# Patient Record
Sex: FEMALE | Race: WHITE | Employment: OTHER | URBAN - METROPOLITAN AREA
[De-identification: names, ages, dates, MRNs, and addresses within clinical notes are randomized per-mention and may not be internally consistent; named-entity substitution may affect disease eponyms.]

---

## 2017-02-23 RX ORDER — PRAVASTATIN SODIUM 20 MG
TABLET ORAL
Qty: 90 TABLET | Refills: 1 | Status: SHIPPED | OUTPATIENT
Start: 2017-02-23 | End: 2017-08-17 | Stop reason: SDUPTHER

## 2017-03-01 RX ORDER — LORAZEPAM 0.5 MG/1
TABLET ORAL
Qty: 180 TABLET | Refills: 0 | OUTPATIENT
Start: 2017-03-01 | End: 2017-05-30 | Stop reason: SDUPTHER

## 2017-03-20 RX ORDER — PROPRANOLOL HYDROCHLORIDE 10 MG/1
TABLET ORAL
Qty: 180 TABLET | Refills: 1 | Status: ON HOLD | OUTPATIENT
Start: 2017-03-20 | End: 2017-07-21 | Stop reason: HOSPADM

## 2017-04-17 ENCOUNTER — OFFICE VISIT (OUTPATIENT)
Dept: FAMILY MEDICINE CLINIC | Age: 77
End: 2017-04-17

## 2017-04-17 VITALS
DIASTOLIC BLOOD PRESSURE: 74 MMHG | SYSTOLIC BLOOD PRESSURE: 136 MMHG | WEIGHT: 169.4 LBS | TEMPERATURE: 97.4 F | BODY MASS INDEX: 26.59 KG/M2 | HEART RATE: 54 BPM | HEIGHT: 67 IN | OXYGEN SATURATION: 99 %

## 2017-04-17 DIAGNOSIS — F41.9 ANXIETY: ICD-10-CM

## 2017-04-17 DIAGNOSIS — R00.2 PALPITATIONS: ICD-10-CM

## 2017-04-17 DIAGNOSIS — I10 BENIGN ESSENTIAL HYPERTENSION: Primary | ICD-10-CM

## 2017-04-17 DIAGNOSIS — G89.29 CHRONIC MIDLINE LOW BACK PAIN WITHOUT SCIATICA: ICD-10-CM

## 2017-04-17 DIAGNOSIS — M54.50 CHRONIC MIDLINE LOW BACK PAIN WITHOUT SCIATICA: ICD-10-CM

## 2017-04-17 LAB
A/G RATIO: 1.9 (ref 1.1–2.2)
ALBUMIN SERPL-MCNC: 3.8 G/DL (ref 3.4–5)
ALP BLD-CCNC: 81 U/L (ref 40–129)
ALT SERPL-CCNC: 15 U/L (ref 10–40)
ANION GAP SERPL CALCULATED.3IONS-SCNC: 11 MMOL/L (ref 3–16)
AST SERPL-CCNC: 17 U/L (ref 15–37)
BASOPHILS ABSOLUTE: 0.1 K/UL (ref 0–0.2)
BASOPHILS RELATIVE PERCENT: 1.1 %
BILIRUB SERPL-MCNC: 0.4 MG/DL (ref 0–1)
BUN BLDV-MCNC: 22 MG/DL (ref 7–20)
CALCIUM SERPL-MCNC: 9.6 MG/DL (ref 8.3–10.6)
CHLORIDE BLD-SCNC: 101 MMOL/L (ref 99–110)
CO2: 29 MMOL/L (ref 21–32)
CREAT SERPL-MCNC: 0.8 MG/DL (ref 0.6–1.2)
EOSINOPHILS ABSOLUTE: 0.1 K/UL (ref 0–0.6)
EOSINOPHILS RELATIVE PERCENT: 2.4 %
GFR AFRICAN AMERICAN: >60
GFR NON-AFRICAN AMERICAN: >60
GLOBULIN: 2 G/DL
GLUCOSE BLD-MCNC: 93 MG/DL (ref 70–99)
HCT VFR BLD CALC: 39.7 % (ref 36–48)
HEMOGLOBIN: 12.8 G/DL (ref 12–16)
LYMPHOCYTES ABSOLUTE: 1.8 K/UL (ref 1–5.1)
LYMPHOCYTES RELATIVE PERCENT: 30.1 %
MCH RBC QN AUTO: 29.6 PG (ref 26–34)
MCHC RBC AUTO-ENTMCNC: 32.2 G/DL (ref 31–36)
MCV RBC AUTO: 92 FL (ref 80–100)
MONOCYTES ABSOLUTE: 0.5 K/UL (ref 0–1.3)
MONOCYTES RELATIVE PERCENT: 8.1 %
NEUTROPHILS ABSOLUTE: 3.5 K/UL (ref 1.7–7.7)
NEUTROPHILS RELATIVE PERCENT: 58.3 %
PDW BLD-RTO: 13.9 % (ref 12.4–15.4)
PLATELET # BLD: 214 K/UL (ref 135–450)
PMV BLD AUTO: 10.8 FL (ref 5–10.5)
POTASSIUM SERPL-SCNC: 4.6 MMOL/L (ref 3.5–5.1)
RBC # BLD: 4.31 M/UL (ref 4–5.2)
SODIUM BLD-SCNC: 141 MMOL/L (ref 136–145)
TOTAL PROTEIN: 5.8 G/DL (ref 6.4–8.2)
WBC # BLD: 6 K/UL (ref 4–11)

## 2017-04-17 PROCEDURE — 36415 COLL VENOUS BLD VENIPUNCTURE: CPT | Performed by: FAMILY MEDICINE

## 2017-04-17 PROCEDURE — 99214 OFFICE O/P EST MOD 30 MIN: CPT | Performed by: FAMILY MEDICINE

## 2017-05-27 RX ORDER — AMLODIPINE BESYLATE 2.5 MG/1
TABLET ORAL
Qty: 180 TABLET | Refills: 1 | Status: ON HOLD | OUTPATIENT
Start: 2017-05-27 | End: 2017-07-21 | Stop reason: HOSPADM

## 2017-06-01 RX ORDER — LORAZEPAM 0.5 MG/1
TABLET ORAL
Qty: 180 TABLET | Refills: 0 | OUTPATIENT
Start: 2017-06-01 | End: 2017-08-29 | Stop reason: SDUPTHER

## 2017-06-06 ENCOUNTER — OFFICE VISIT (OUTPATIENT)
Dept: FAMILY MEDICINE CLINIC | Age: 77
End: 2017-06-06

## 2017-06-06 VITALS
HEART RATE: 75 BPM | DIASTOLIC BLOOD PRESSURE: 76 MMHG | WEIGHT: 170 LBS | SYSTOLIC BLOOD PRESSURE: 128 MMHG | OXYGEN SATURATION: 95 % | TEMPERATURE: 97.5 F | BODY MASS INDEX: 26.63 KG/M2

## 2017-06-06 DIAGNOSIS — J01.10 ACUTE NON-RECURRENT FRONTAL SINUSITIS: Primary | ICD-10-CM

## 2017-06-06 PROCEDURE — 99213 OFFICE O/P EST LOW 20 MIN: CPT | Performed by: FAMILY MEDICINE

## 2017-06-06 RX ORDER — AZITHROMYCIN 250 MG/1
TABLET, FILM COATED ORAL
Qty: 1 PACKET | Refills: 0 | Status: SHIPPED | OUTPATIENT
Start: 2017-06-06 | End: 2017-06-16

## 2017-07-20 ENCOUNTER — OFFICE VISIT (OUTPATIENT)
Dept: FAMILY MEDICINE CLINIC | Age: 77
End: 2017-07-20

## 2017-07-20 VITALS
OXYGEN SATURATION: 97 % | WEIGHT: 169.38 LBS | DIASTOLIC BLOOD PRESSURE: 66 MMHG | BODY MASS INDEX: 26.53 KG/M2 | SYSTOLIC BLOOD PRESSURE: 119 MMHG | HEART RATE: 119 BPM

## 2017-07-20 DIAGNOSIS — I48.91 NEW ONSET ATRIAL FIBRILLATION (HCC): Primary | ICD-10-CM

## 2017-07-20 DIAGNOSIS — R06.02 SOB (SHORTNESS OF BREATH): ICD-10-CM

## 2017-07-20 DIAGNOSIS — R61 DIAPHORESIS: ICD-10-CM

## 2017-07-20 PROCEDURE — 99213 OFFICE O/P EST LOW 20 MIN: CPT | Performed by: FAMILY MEDICINE

## 2017-07-20 PROCEDURE — 93000 ELECTROCARDIOGRAM COMPLETE: CPT | Performed by: FAMILY MEDICINE

## 2017-07-24 ENCOUNTER — OFFICE VISIT (OUTPATIENT)
Dept: FAMILY MEDICINE CLINIC | Age: 77
End: 2017-07-24

## 2017-07-24 VITALS
TEMPERATURE: 97.5 F | WEIGHT: 170 LBS | BODY MASS INDEX: 26.63 KG/M2 | HEART RATE: 55 BPM | SYSTOLIC BLOOD PRESSURE: 122 MMHG | DIASTOLIC BLOOD PRESSURE: 70 MMHG | OXYGEN SATURATION: 97 %

## 2017-07-24 DIAGNOSIS — I48.0 INTERMITTENT ATRIAL FIBRILLATION (HCC): ICD-10-CM

## 2017-07-24 DIAGNOSIS — R00.1 BRADYCARDIA: ICD-10-CM

## 2017-07-24 DIAGNOSIS — R00.2 PALPITATIONS: ICD-10-CM

## 2017-07-24 DIAGNOSIS — I10 BENIGN ESSENTIAL HYPERTENSION: ICD-10-CM

## 2017-07-24 DIAGNOSIS — I48.91 NEW ONSET ATRIAL FIBRILLATION (HCC): Primary | ICD-10-CM

## 2017-07-24 PROCEDURE — 99213 OFFICE O/P EST LOW 20 MIN: CPT | Performed by: FAMILY MEDICINE

## 2017-07-26 ENCOUNTER — ANTI-COAG VISIT (OUTPATIENT)
Dept: PHARMACY | Facility: CLINIC | Age: 77
End: 2017-07-26

## 2017-07-26 DIAGNOSIS — I48.0 INTERMITTENT ATRIAL FIBRILLATION (HCC): ICD-10-CM

## 2017-07-26 LAB — INR BLD: 1.1

## 2017-07-28 ENCOUNTER — NURSE ONLY (OUTPATIENT)
Dept: FAMILY MEDICINE CLINIC | Age: 77
End: 2017-07-28

## 2017-07-28 DIAGNOSIS — Z51.81 MEDICATION MONITORING ENCOUNTER: Primary | ICD-10-CM

## 2017-07-28 DIAGNOSIS — Z51.81 MEDICATION MONITORING ENCOUNTER: ICD-10-CM

## 2017-07-28 DIAGNOSIS — I48.0 INTERMITTENT ATRIAL FIBRILLATION (HCC): ICD-10-CM

## 2017-07-28 DIAGNOSIS — I10 BENIGN ESSENTIAL HYPERTENSION: ICD-10-CM

## 2017-07-28 LAB — DIGOXIN LEVEL: 0.6 NG/ML (ref 0.8–2)

## 2017-07-28 PROCEDURE — 36415 COLL VENOUS BLD VENIPUNCTURE: CPT | Performed by: FAMILY MEDICINE

## 2017-08-02 ENCOUNTER — ANTI-COAG VISIT (OUTPATIENT)
Dept: PHARMACY | Facility: CLINIC | Age: 77
End: 2017-08-02

## 2017-08-02 LAB — INR BLD: 2.1

## 2017-08-07 RX ORDER — DIGOXIN 125 MCG
125 TABLET ORAL DAILY
Qty: 30 TABLET | Refills: 5 | Status: SHIPPED | OUTPATIENT
Start: 2017-08-07 | End: 2017-09-13 | Stop reason: SDUPTHER

## 2017-08-07 RX ORDER — WARFARIN SODIUM 2.5 MG/1
TABLET ORAL
Qty: 60 TABLET | Refills: 5 | Status: SHIPPED | OUTPATIENT
Start: 2017-08-07 | End: 2017-09-19 | Stop reason: SDUPTHER

## 2017-08-09 ENCOUNTER — ANTI-COAG VISIT (OUTPATIENT)
Dept: PHARMACY | Facility: CLINIC | Age: 77
End: 2017-08-09

## 2017-08-09 LAB — INR BLD: 2.2

## 2017-08-11 ENCOUNTER — OFFICE VISIT (OUTPATIENT)
Dept: FAMILY MEDICINE CLINIC | Age: 77
End: 2017-08-11

## 2017-08-11 ENCOUNTER — TELEPHONE (OUTPATIENT)
Dept: FAMILY MEDICINE CLINIC | Age: 77
End: 2017-08-11

## 2017-08-11 VITALS
HEART RATE: 51 BPM | WEIGHT: 172 LBS | OXYGEN SATURATION: 98 % | BODY MASS INDEX: 26.94 KG/M2 | DIASTOLIC BLOOD PRESSURE: 64 MMHG | SYSTOLIC BLOOD PRESSURE: 148 MMHG | TEMPERATURE: 97.5 F

## 2017-08-11 DIAGNOSIS — I10 UNCONTROLLED HYPERTENSION: Primary | ICD-10-CM

## 2017-08-11 DIAGNOSIS — M25.561 ACUTE PAIN OF BOTH KNEES: ICD-10-CM

## 2017-08-11 DIAGNOSIS — M54.50 CHRONIC MIDLINE LOW BACK PAIN WITHOUT SCIATICA: Primary | ICD-10-CM

## 2017-08-11 DIAGNOSIS — G89.29 CHRONIC MIDLINE LOW BACK PAIN WITHOUT SCIATICA: Primary | ICD-10-CM

## 2017-08-11 DIAGNOSIS — M25.562 ACUTE PAIN OF BOTH KNEES: ICD-10-CM

## 2017-08-11 PROCEDURE — 99214 OFFICE O/P EST MOD 30 MIN: CPT | Performed by: FAMILY MEDICINE

## 2017-08-11 RX ORDER — ACETAMINOPHEN 500 MG
500-1000 TABLET ORAL 3 TIMES DAILY PRN
Qty: 1 TABLET | Refills: 0 | Status: SHIPPED
Start: 2017-08-11

## 2017-08-11 RX ORDER — AMLODIPINE BESYLATE 2.5 MG/1
2.5 TABLET ORAL 2 TIMES DAILY
Qty: 1 TABLET | Refills: 0
Start: 2017-08-11 | End: 2018-04-10 | Stop reason: SDUPTHER

## 2017-08-17 RX ORDER — PRAVASTATIN SODIUM 20 MG
TABLET ORAL
Qty: 90 TABLET | Refills: 1 | Status: SHIPPED | OUTPATIENT
Start: 2017-08-17 | End: 2018-02-08 | Stop reason: SDUPTHER

## 2017-08-23 ENCOUNTER — ANTI-COAG VISIT (OUTPATIENT)
Dept: PHARMACY | Facility: CLINIC | Age: 77
End: 2017-08-23

## 2017-08-23 DIAGNOSIS — I48.0 INTERMITTENT ATRIAL FIBRILLATION (HCC): ICD-10-CM

## 2017-08-23 LAB — INR BLD: 2.7

## 2017-08-25 PROBLEM — M17.0 PRIMARY OSTEOARTHRITIS OF KNEES, BILATERAL: Status: ACTIVE | Noted: 2017-08-25

## 2017-08-29 ENCOUNTER — OFFICE VISIT (OUTPATIENT)
Dept: FAMILY MEDICINE CLINIC | Age: 77
End: 2017-08-29

## 2017-08-29 VITALS
DIASTOLIC BLOOD PRESSURE: 80 MMHG | BODY MASS INDEX: 27 KG/M2 | HEART RATE: 58 BPM | OXYGEN SATURATION: 96 % | WEIGHT: 172.4 LBS | SYSTOLIC BLOOD PRESSURE: 130 MMHG

## 2017-08-29 DIAGNOSIS — F41.9 ANXIETY: ICD-10-CM

## 2017-08-29 DIAGNOSIS — I48.0 INTERMITTENT ATRIAL FIBRILLATION (HCC): ICD-10-CM

## 2017-08-29 DIAGNOSIS — E78.00 PURE HYPERCHOLESTEROLEMIA: ICD-10-CM

## 2017-08-29 DIAGNOSIS — I10 BENIGN ESSENTIAL HYPERTENSION: Primary | ICD-10-CM

## 2017-08-29 DIAGNOSIS — M17.0 PRIMARY OSTEOARTHRITIS OF KNEES, BILATERAL: ICD-10-CM

## 2017-08-29 PROCEDURE — 36415 COLL VENOUS BLD VENIPUNCTURE: CPT | Performed by: FAMILY MEDICINE

## 2017-08-29 PROCEDURE — 99214 OFFICE O/P EST MOD 30 MIN: CPT | Performed by: FAMILY MEDICINE

## 2017-08-29 RX ORDER — LORAZEPAM 0.5 MG/1
TABLET ORAL
Qty: 180 TABLET | Refills: 0 | Status: SHIPPED | OUTPATIENT
Start: 2017-08-29 | End: 2017-12-08 | Stop reason: SDUPTHER

## 2017-08-29 ASSESSMENT — PATIENT HEALTH QUESTIONNAIRE - PHQ9
2. FEELING DOWN, DEPRESSED OR HOPELESS: 0
SUM OF ALL RESPONSES TO PHQ QUESTIONS 1-9: 0
1. LITTLE INTEREST OR PLEASURE IN DOING THINGS: 0
SUM OF ALL RESPONSES TO PHQ9 QUESTIONS 1 & 2: 0

## 2017-08-30 LAB
CHOLESTEROL, TOTAL: 199 MG/DL (ref 0–199)
HDLC SERPL-MCNC: 56 MG/DL (ref 40–60)
LDL CHOLESTEROL CALCULATED: 118 MG/DL
TRIGL SERPL-MCNC: 124 MG/DL (ref 0–150)
VLDLC SERPL CALC-MCNC: 25 MG/DL

## 2017-09-13 ENCOUNTER — ANTI-COAG VISIT (OUTPATIENT)
Dept: PHARMACY | Facility: CLINIC | Age: 77
End: 2017-09-13

## 2017-09-13 DIAGNOSIS — I48.0 INTERMITTENT ATRIAL FIBRILLATION (HCC): ICD-10-CM

## 2017-09-13 LAB — INR BLD: 2.3

## 2017-09-13 RX ORDER — DIGOXIN 125 MCG
125 TABLET ORAL DAILY
Qty: 90 TABLET | Refills: 3 | Status: SHIPPED | OUTPATIENT
Start: 2017-09-13 | End: 2017-12-18 | Stop reason: SDUPTHER

## 2017-09-19 RX ORDER — WARFARIN SODIUM 2.5 MG/1
TABLET ORAL
Qty: 180 TABLET | Refills: 1 | Status: SHIPPED | OUTPATIENT
Start: 2017-09-19 | End: 2018-03-22 | Stop reason: SDUPTHER

## 2017-09-21 ENCOUNTER — NURSE ONLY (OUTPATIENT)
Dept: FAMILY MEDICINE CLINIC | Age: 77
End: 2017-09-21

## 2017-09-21 DIAGNOSIS — Z23 NEED FOR INFLUENZA VACCINATION: Primary | ICD-10-CM

## 2017-09-21 PROCEDURE — 90688 IIV4 VACCINE SPLT 0.5 ML IM: CPT | Performed by: FAMILY MEDICINE

## 2017-09-21 PROCEDURE — G0008 ADMIN INFLUENZA VIRUS VAC: HCPCS | Performed by: FAMILY MEDICINE

## 2017-10-11 ENCOUNTER — ANTI-COAG VISIT (OUTPATIENT)
Dept: PHARMACY | Facility: CLINIC | Age: 77
End: 2017-10-11

## 2017-10-11 DIAGNOSIS — I48.0 INTERMITTENT ATRIAL FIBRILLATION (HCC): ICD-10-CM

## 2017-10-11 LAB — INR BLD: 2.6

## 2017-11-08 ENCOUNTER — ANTI-COAG VISIT (OUTPATIENT)
Dept: PHARMACY | Facility: CLINIC | Age: 77
End: 2017-11-08

## 2017-11-08 LAB — INR BLD: 2.8

## 2017-12-06 ENCOUNTER — ANTI-COAG VISIT (OUTPATIENT)
Dept: PHARMACY | Facility: CLINIC | Age: 77
End: 2017-12-06

## 2017-12-06 LAB — INR BLD: 2.4

## 2017-12-08 RX ORDER — LORAZEPAM 0.5 MG/1
TABLET ORAL
Qty: 180 TABLET | Refills: 0 | OUTPATIENT
Start: 2017-12-08 | End: 2018-04-04 | Stop reason: SDUPTHER

## 2017-12-18 ENCOUNTER — OFFICE VISIT (OUTPATIENT)
Dept: FAMILY MEDICINE CLINIC | Age: 77
End: 2017-12-18

## 2017-12-18 VITALS
HEART RATE: 64 BPM | SYSTOLIC BLOOD PRESSURE: 130 MMHG | BODY MASS INDEX: 27.66 KG/M2 | DIASTOLIC BLOOD PRESSURE: 70 MMHG | WEIGHT: 176.6 LBS | OXYGEN SATURATION: 98 %

## 2017-12-18 DIAGNOSIS — K21.9 GASTROESOPHAGEAL REFLUX DISEASE, ESOPHAGITIS PRESENCE NOT SPECIFIED: ICD-10-CM

## 2017-12-18 DIAGNOSIS — I48.0 INTERMITTENT ATRIAL FIBRILLATION (HCC): ICD-10-CM

## 2017-12-18 DIAGNOSIS — F41.9 ANXIETY: ICD-10-CM

## 2017-12-18 DIAGNOSIS — I10 BENIGN ESSENTIAL HYPERTENSION: Primary | ICD-10-CM

## 2017-12-18 PROCEDURE — G8427 DOCREV CUR MEDS BY ELIG CLIN: HCPCS | Performed by: FAMILY MEDICINE

## 2017-12-18 PROCEDURE — G8417 CALC BMI ABV UP PARAM F/U: HCPCS | Performed by: FAMILY MEDICINE

## 2017-12-18 PROCEDURE — 1090F PRES/ABSN URINE INCON ASSESS: CPT | Performed by: FAMILY MEDICINE

## 2017-12-18 PROCEDURE — 1123F ACP DISCUSS/DSCN MKR DOCD: CPT | Performed by: FAMILY MEDICINE

## 2017-12-18 PROCEDURE — 99214 OFFICE O/P EST MOD 30 MIN: CPT | Performed by: FAMILY MEDICINE

## 2017-12-18 PROCEDURE — 4040F PNEUMOC VAC/ADMIN/RCVD: CPT | Performed by: FAMILY MEDICINE

## 2017-12-18 PROCEDURE — G8399 PT W/DXA RESULTS DOCUMENT: HCPCS | Performed by: FAMILY MEDICINE

## 2017-12-18 PROCEDURE — G8484 FLU IMMUNIZE NO ADMIN: HCPCS | Performed by: FAMILY MEDICINE

## 2017-12-18 PROCEDURE — 1036F TOBACCO NON-USER: CPT | Performed by: FAMILY MEDICINE

## 2017-12-18 RX ORDER — DIGOXIN 125 MCG
125 TABLET ORAL DAILY
Qty: 90 TABLET | Refills: 3 | Status: SHIPPED | OUTPATIENT
Start: 2017-12-18 | End: 2018-11-23 | Stop reason: SDUPTHER

## 2017-12-18 NOTE — PROGRESS NOTES
oriented, and in no acute distress  Psych:  mood and affect are unremarkable               speech and thought processes appear intact               Behavior and appearance unremarkable  Neck:  No masses, trachea midline, phonation normal   Thyroid - unremarkable              Cervical  adenopathy - nothing significant  Chest:  No deformity of thorax               Respirations easy and unlabored              Lungs - clear to auscultation     Breath sounds - equal  Heart:   Rhythm - regular              Murmur - none    Elizabeth Barrett MD    Outpatient Prescriptions Marked as Taking for the 12/18/17 encounter (Office Visit) with Elizabeth Barrett MD   Medication Sig Dispense Refill    digoxin (LANOXIN) 125 MCG tablet Take 1 tablet by mouth daily 90 tablet 3    LORazepam (ATIVAN) 0.5 MG tablet TAKE 1 TABLET TWICE A  tablet 0    warfarin (COUMADIN) 2.5 MG tablet 5 mg (2.5 mg x 2) on Mon, Wed, Fri; 2.5 mg (2.5 mg x 1) all other days 180 tablet 1    diclofenac sodium 1 % GEL Apply 2 g topically 2 times daily 1 Tube 3    pravastatin (PRAVACHOL) 20 MG tablet TAKE 1 TABLET BY MOUTH NIGHTLY 90 tablet 1    amLODIPine (NORVASC) 2.5 MG tablet Take 1 tablet by mouth 2 times daily 1 tablet 0    acetaminophen (TYLENOL) 500 MG tablet Take 1-2 tablets by mouth 3 times daily as needed for Pain 1 tablet 0    calcium carbonate 600 MG TABS tablet Take 1 tablet by mouth daily. The note was completed using Dragon voice recognition transcription. Every effort was made to ensure accuracy; however, inadvertent  transcription errors may be present despite my best efforts to edit errors.

## 2017-12-18 NOTE — PATIENT INSTRUCTIONS
Please read the healthy family handout that you were given and share it with your family. Please compare this printed medication list with your medications at home to be sure they are the same. If you have any medications that are different please contact us immediately at [phone]. Also review your allergies that we have listed, these may also include medications that you have not been able to tolerate, make sure everything listed is correct. If you have any allergies that are different please contact us immediately at [phone].

## 2017-12-26 RX ORDER — AMLODIPINE BESYLATE 2.5 MG/1
TABLET ORAL
Qty: 180 TABLET | Refills: 1 | Status: SHIPPED | OUTPATIENT
Start: 2017-12-26 | End: 2018-06-17 | Stop reason: SDUPTHER

## 2018-01-03 ENCOUNTER — ANTI-COAG VISIT (OUTPATIENT)
Dept: PHARMACY | Facility: CLINIC | Age: 78
End: 2018-01-03

## 2018-01-03 LAB — INR BLD: 2.4

## 2018-01-03 NOTE — PROGRESS NOTES
Ms. Jesse Willis is here for management of anticoagulation for AFib. PMH also significant for HTN and HLD. She presents today w/out complaint. Pt verifies dosing regimen as listed above. Pt denies s/s bleeding/bruising/swelling/SOB. No BRBPR. No melena. Denies missed doses. Reviewed home medications. No changes in RX/OTCs/Herbal medications. Reviewed dietary concerns   Denies EToH and tobacco use. INR 2.4 is within acceptable therapeutic range of 2-3  Recommend to continue warfarin regimen of 2.5 mg daily, except 5mg MWF. Patient has 2.5 mg tablets. Will continue to monitor and check INR in 4 weeks. Dosing reminder card given with phone number, appointment date and time.    Return to clinic: 1/31 @ 9:30am

## 2018-01-31 ENCOUNTER — ANTI-COAG VISIT (OUTPATIENT)
Dept: PHARMACY | Facility: CLINIC | Age: 78
End: 2018-01-31

## 2018-01-31 LAB — INR BLD: 2.8

## 2018-02-09 RX ORDER — PRAVASTATIN SODIUM 20 MG
TABLET ORAL
Qty: 90 TABLET | Refills: 1 | Status: SHIPPED | OUTPATIENT
Start: 2018-02-09 | End: 2018-08-03 | Stop reason: SDUPTHER

## 2018-02-28 ENCOUNTER — ANTI-COAG VISIT (OUTPATIENT)
Dept: PHARMACY | Facility: CLINIC | Age: 78
End: 2018-02-28

## 2018-02-28 LAB — INR BLD: 3.1

## 2018-03-22 RX ORDER — WARFARIN SODIUM 2.5 MG/1
TABLET ORAL
Qty: 180 TABLET | Refills: 1 | Status: SHIPPED | OUTPATIENT
Start: 2018-03-22 | End: 2018-12-03 | Stop reason: SDUPTHER

## 2018-03-28 ENCOUNTER — ANTI-COAG VISIT (OUTPATIENT)
Dept: PHARMACY | Facility: CLINIC | Age: 78
End: 2018-03-28

## 2018-03-28 LAB — INR BLD: 3.6

## 2018-04-05 RX ORDER — LORAZEPAM 0.5 MG/1
TABLET ORAL
Qty: 180 TABLET | Refills: 0 | OUTPATIENT
Start: 2018-04-05 | End: 2018-07-28 | Stop reason: SDUPTHER

## 2018-04-10 ENCOUNTER — OFFICE VISIT (OUTPATIENT)
Dept: FAMILY MEDICINE CLINIC | Age: 78
End: 2018-04-10

## 2018-04-10 VITALS
BODY MASS INDEX: 27.28 KG/M2 | WEIGHT: 173.8 LBS | SYSTOLIC BLOOD PRESSURE: 132 MMHG | HEIGHT: 67 IN | TEMPERATURE: 98.8 F | HEART RATE: 59 BPM | DIASTOLIC BLOOD PRESSURE: 74 MMHG | OXYGEN SATURATION: 95 %

## 2018-04-10 DIAGNOSIS — I48.0 INTERMITTENT ATRIAL FIBRILLATION (HCC): ICD-10-CM

## 2018-04-10 DIAGNOSIS — M17.0 PRIMARY OSTEOARTHRITIS OF KNEES, BILATERAL: ICD-10-CM

## 2018-04-10 DIAGNOSIS — E78.00 PURE HYPERCHOLESTEROLEMIA: ICD-10-CM

## 2018-04-10 DIAGNOSIS — F41.9 ANXIETY: ICD-10-CM

## 2018-04-10 DIAGNOSIS — I10 BENIGN ESSENTIAL HYPERTENSION: Primary | ICD-10-CM

## 2018-04-10 LAB
ANION GAP SERPL CALCULATED.3IONS-SCNC: 13 MMOL/L (ref 3–16)
BANDED NEUTROPHILS RELATIVE PERCENT: 1 % (ref 0–7)
BASOPHILS ABSOLUTE: 0 K/UL (ref 0–0.2)
BASOPHILS RELATIVE PERCENT: 0 %
BUN BLDV-MCNC: 13 MG/DL (ref 7–20)
CALCIUM SERPL-MCNC: 9.2 MG/DL (ref 8.3–10.6)
CHLORIDE BLD-SCNC: 99 MMOL/L (ref 99–110)
CHOLESTEROL, TOTAL: 197 MG/DL (ref 0–199)
CO2: 27 MMOL/L (ref 21–32)
CREAT SERPL-MCNC: 0.7 MG/DL (ref 0.6–1.2)
DIGOXIN LEVEL: 1 NG/ML (ref 0.8–2)
EOSINOPHILS ABSOLUTE: 0 K/UL (ref 0–0.6)
EOSINOPHILS RELATIVE PERCENT: 0 %
GFR AFRICAN AMERICAN: >60
GFR NON-AFRICAN AMERICAN: >60
GLUCOSE BLD-MCNC: 92 MG/DL (ref 70–99)
HCT VFR BLD CALC: 39.4 % (ref 36–48)
HDLC SERPL-MCNC: 45 MG/DL (ref 40–60)
HEMOGLOBIN: 13.4 G/DL (ref 12–16)
LDL CHOLESTEROL CALCULATED: 116 MG/DL
LYMPHOCYTES ABSOLUTE: 1.9 K/UL (ref 1–5.1)
LYMPHOCYTES RELATIVE PERCENT: 26 %
MCH RBC QN AUTO: 29.1 PG (ref 26–34)
MCHC RBC AUTO-ENTMCNC: 33.9 G/DL (ref 31–36)
MCV RBC AUTO: 85.8 FL (ref 80–100)
MONOCYTES ABSOLUTE: 0.8 K/UL (ref 0–1.3)
MONOCYTES RELATIVE PERCENT: 11 %
NEUTROPHILS ABSOLUTE: 4.5 K/UL (ref 1.7–7.7)
NEUTROPHILS RELATIVE PERCENT: 62 %
PDW BLD-RTO: 14.9 % (ref 12.4–15.4)
PLATELET # BLD: 235 K/UL (ref 135–450)
PMV BLD AUTO: 10.1 FL (ref 5–10.5)
POTASSIUM SERPL-SCNC: 4.4 MMOL/L (ref 3.5–5.1)
RBC # BLD: 4.59 M/UL (ref 4–5.2)
SODIUM BLD-SCNC: 139 MMOL/L (ref 136–145)
TRIGL SERPL-MCNC: 180 MG/DL (ref 0–150)
VLDLC SERPL CALC-MCNC: 36 MG/DL
WBC # BLD: 7.2 K/UL (ref 4–11)

## 2018-04-10 PROCEDURE — 99214 OFFICE O/P EST MOD 30 MIN: CPT | Performed by: FAMILY MEDICINE

## 2018-04-10 PROCEDURE — 36415 COLL VENOUS BLD VENIPUNCTURE: CPT | Performed by: FAMILY MEDICINE

## 2018-04-11 ENCOUNTER — ANTI-COAG VISIT (OUTPATIENT)
Dept: PHARMACY | Facility: CLINIC | Age: 78
End: 2018-04-11

## 2018-04-11 LAB — INR BLD: 3.5

## 2018-04-20 ENCOUNTER — OFFICE VISIT (OUTPATIENT)
Dept: FAMILY MEDICINE CLINIC | Age: 78
End: 2018-04-20

## 2018-04-20 VITALS
TEMPERATURE: 97.6 F | SYSTOLIC BLOOD PRESSURE: 128 MMHG | WEIGHT: 174.8 LBS | BODY MASS INDEX: 27.38 KG/M2 | DIASTOLIC BLOOD PRESSURE: 74 MMHG | HEART RATE: 61 BPM | OXYGEN SATURATION: 96 %

## 2018-04-20 DIAGNOSIS — H11.32 SUBCONJUNCTIVAL HEMORRHAGE OF LEFT EYE: Primary | ICD-10-CM

## 2018-04-20 DIAGNOSIS — Z79.01 CHRONIC ANTICOAGULATION: ICD-10-CM

## 2018-04-20 PROCEDURE — 99213 OFFICE O/P EST LOW 20 MIN: CPT | Performed by: FAMILY MEDICINE

## 2018-04-25 ENCOUNTER — ANTI-COAG VISIT (OUTPATIENT)
Dept: PHARMACY | Facility: CLINIC | Age: 78
End: 2018-04-25

## 2018-04-25 LAB — INR BLD: 1.9

## 2018-05-16 ENCOUNTER — ANTI-COAG VISIT (OUTPATIENT)
Dept: PHARMACY | Facility: CLINIC | Age: 78
End: 2018-05-16

## 2018-05-16 DIAGNOSIS — I48.0 INTERMITTENT ATRIAL FIBRILLATION (HCC): ICD-10-CM

## 2018-05-16 LAB — INR BLD: 2.4

## 2018-06-01 ENCOUNTER — OFFICE VISIT (OUTPATIENT)
Dept: FAMILY MEDICINE CLINIC | Age: 78
End: 2018-06-01

## 2018-06-01 VITALS
OXYGEN SATURATION: 95 % | TEMPERATURE: 97.7 F | DIASTOLIC BLOOD PRESSURE: 76 MMHG | SYSTOLIC BLOOD PRESSURE: 134 MMHG | WEIGHT: 175 LBS | BODY MASS INDEX: 27.41 KG/M2 | HEART RATE: 74 BPM

## 2018-06-01 DIAGNOSIS — R41.3 MEMORY LOSS: Primary | ICD-10-CM

## 2018-06-01 DIAGNOSIS — I48.0 INTERMITTENT ATRIAL FIBRILLATION (HCC): ICD-10-CM

## 2018-06-01 PROCEDURE — 99213 OFFICE O/P EST LOW 20 MIN: CPT | Performed by: FAMILY MEDICINE

## 2018-06-13 ENCOUNTER — ANTI-COAG VISIT (OUTPATIENT)
Dept: PHARMACY | Facility: CLINIC | Age: 78
End: 2018-06-13

## 2018-06-13 DIAGNOSIS — I48.0 INTERMITTENT ATRIAL FIBRILLATION (HCC): ICD-10-CM

## 2018-06-13 LAB — INR BLD: 3.5

## 2018-06-18 RX ORDER — AMLODIPINE BESYLATE 2.5 MG/1
TABLET ORAL
Qty: 180 TABLET | Refills: 1 | Status: SHIPPED | OUTPATIENT
Start: 2018-06-18 | End: 2018-12-11 | Stop reason: SDUPTHER

## 2018-06-27 ENCOUNTER — ANTI-COAG VISIT (OUTPATIENT)
Dept: PHARMACY | Facility: CLINIC | Age: 78
End: 2018-06-27

## 2018-06-27 DIAGNOSIS — I48.0 INTERMITTENT ATRIAL FIBRILLATION (HCC): ICD-10-CM

## 2018-06-27 LAB — INR BLD: 3.6

## 2018-07-11 ENCOUNTER — ANTI-COAG VISIT (OUTPATIENT)
Dept: PHARMACY | Facility: CLINIC | Age: 78
End: 2018-07-11

## 2018-07-11 DIAGNOSIS — I48.0 INTERMITTENT ATRIAL FIBRILLATION (HCC): ICD-10-CM

## 2018-07-11 LAB — INR BLD: 2.7

## 2018-07-17 ENCOUNTER — OFFICE VISIT (OUTPATIENT)
Dept: FAMILY MEDICINE CLINIC | Age: 78
End: 2018-07-17

## 2018-07-17 VITALS
SYSTOLIC BLOOD PRESSURE: 131 MMHG | HEART RATE: 58 BPM | RESPIRATION RATE: 16 BRPM | BODY MASS INDEX: 27.28 KG/M2 | WEIGHT: 174.2 LBS | DIASTOLIC BLOOD PRESSURE: 84 MMHG | OXYGEN SATURATION: 95 %

## 2018-07-17 DIAGNOSIS — K21.9 GASTROESOPHAGEAL REFLUX DISEASE WITHOUT ESOPHAGITIS: ICD-10-CM

## 2018-07-17 DIAGNOSIS — R41.3 MEMORY CHANGES: Primary | ICD-10-CM

## 2018-07-17 DIAGNOSIS — R42 DIZZINESS: ICD-10-CM

## 2018-07-17 PROCEDURE — 99213 OFFICE O/P EST LOW 20 MIN: CPT | Performed by: NURSE PRACTITIONER

## 2018-07-17 RX ORDER — OMEPRAZOLE 20 MG/1
20 CAPSULE, DELAYED RELEASE ORAL DAILY
Qty: 30 CAPSULE | Refills: 3 | Status: SHIPPED | OUTPATIENT
Start: 2018-07-17 | End: 2018-11-01 | Stop reason: SDUPTHER

## 2018-07-17 ASSESSMENT — ENCOUNTER SYMPTOMS
ABDOMINAL PAIN: 1
RESPIRATORY NEGATIVE: 1
EYES NEGATIVE: 1
ALLERGIC/IMMUNOLOGIC NEGATIVE: 1

## 2018-07-17 NOTE — PROGRESS NOTES
Subjective:      Patient ID: Rich Galeas is a [de-identified] y.o. female. HPI    Chief Complaint   Patient presents with    Memory Loss     Pt states this began after beginning digoxin medication,gotten worse over past 3-4 months, worse in the evenings      Patient reports concerns with forgetfulness. Patient/daughter reports this has been intermittent forgetfulness that seems to be worse when she fatigues in the evening. Patient/daugher denies any current safety concerns. Patient questions if digoxin may be cause of forgetfulness as she feels she has been more forgetful since of this medication - Daughter does not agree with this. Daughter feels patient has mild gradual forgetfulness over the past few years. Patient/daughter denies any abrupt changes recent or remote. MINI-MENTAL STATUS EXAM (Rebecca Calero)    What is the Year? Season? Date? Day? Month?   (indicate # correct)        4    Where are we: State? County? Town? Place? Floor? (indicate # correct)        5    Name 3 objects and have pt repeat.                (indicate # correct)        3    Serial 7's or spell \"world\" backwards.                 (indicate # correct)        2    Recall 3 objects                (indicate # correct)        1    Patient names a pencil & a watch                (indicate # correct)        2    Read and obey \"Close your eyes\"                (indicate 1 if correct)     1    Copy intersecting pentagons                (indicate 1 if correct)     1    Write a sentence                (indicate 1 if correct)     1    Repeat \"no ifs, ands, or buts\"                (indicate 1 if correct)     1    Follow 3-stage command                (indicate # done correctly) 3                                            ------------  TOTAL SCORE   (max = 30)                  24      REFERENCE INFORMATION FOR THE CLINICIAN:    \"Low\" score    = 0-23  \"Normal\" score = 24-30    GERD  Paitent complains of heartburn.  This has been associated with epigastric pain and nocturnal burning. She denies any other symptoms. Symptoms have been present for several months. She denies dysphagia. She has not lost weight. She denies melena, hematochezia, hematemesis, and coffee ground emesis. Medical therapy in the past has included antacids. Review of Systems   Constitutional: Negative for appetite change, chills and fever. HENT: Negative. Eyes: Negative. Respiratory: Negative. Cardiovascular: Negative. Gastrointestinal: Positive for abdominal pain (epigastric). Endocrine: Negative. Genitourinary: Negative. Musculoskeletal: Negative. Skin: Negative. Allergic/Immunologic: Negative. Neurological: Positive for dizziness. Psychiatric/Behavioral: Negative. Patient Active Problem List   Diagnosis    Anxiety    Chronic midline low back pain without sciatica    Ex-smoker    Benign essential hypertension    Pure hypercholesterolemia    Intermittent atrial fibrillation (HCC)    Primary osteoarthritis of knees, bilateral       Outpatient Prescriptions Marked as Taking for the 7/17/18 encounter (Office Visit) with ROBERT Montanez CNP   Medication Sig Dispense Refill    amLODIPine (NORVASC) 2.5 MG tablet TAKE 1 TABLET BY MOUTH TWICE A  tablet 1    LORazepam (ATIVAN) 0.5 MG tablet TAKE 1 TABLET BY MOUTH TWICE A  tablet 0    warfarin (COUMADIN) 2.5 MG tablet TAKE 2 TABLETS ON MON WED FRID AND 1 TABLET ALL OTHER DAYS 180 tablet 1    pravastatin (PRAVACHOL) 20 MG tablet TAKE 1 TABLET BY MOUTH NIGHTLY 90 tablet 1    digoxin (LANOXIN) 125 MCG tablet Take 1 tablet by mouth daily 90 tablet 3    calcium carbonate 600 MG TABS tablet Take 1 tablet by mouth daily.          Allergies   Allergen Reactions    Beta Adrenergic Blockers Other (See Comments)     Bradycardia       Social History   Substance Use Topics    Smoking status: Former Smoker     Packs/day: 0.25     Years: 40.00     Quit date: 7/23/2017    Smokeless tobacco: Never Used      Comment: smokes 4 cigarettes a dayand has since 2007    Alcohol use No       Objective:   /84 (Site: Left Arm, Position: Sitting, Cuff Size: Medium Adult)   Pulse 58   Resp 16   Wt 174 lb 3.2 oz (79 kg)   SpO2 95%   BMI 27.28 kg/m²     Physical Exam   Constitutional: She is oriented to person, place, and time. Vital signs are normal. She appears well-developed and well-nourished. She is cooperative. She does not have a sickly appearance. No distress. HENT:   Head: Normocephalic and atraumatic. Eyes: Conjunctivae and EOM are normal. Pupils are equal, round, and reactive to light. Neck: Normal range of motion. Neck supple. Carotid bruit is not present. No thyromegaly present. Cardiovascular: Normal rate, regular rhythm, S1 normal, S2 normal, normal heart sounds, intact distal pulses and normal pulses. Exam reveals no gallop. No murmur heard. Pulmonary/Chest: Effort normal and breath sounds normal. No accessory muscle usage. No respiratory distress. Abdominal: Soft. Bowel sounds are normal.   Musculoskeletal: Normal range of motion. Lymphadenopathy:     She has no cervical adenopathy. Neurological: She is alert and oriented to person, place, and time. Skin: Skin is warm and dry. Psychiatric: She has a normal mood and affect. Her speech is normal and behavior is normal.   Vitals reviewed. Assessment/Plan:   1. Memory changes  Patient/daughter reports forgetfulness over the past 2 years. Patient/daughter denies any abrupt changes. Patient does fill changes may have been slightly worsened since being on digoxin. Daughter reports Dr. Iza Sevilla (cardiologist) does not feel patient's forgetfulness is related to digoxin. ? If related to normal aging process. Discussed possible causes and recommend carotid US as below. Patient/daughter agreeable. - US CAROTID ARTERY BILATERAL; Future    2. Dizziness  See note above. - US CAROTID ARTERY BILATERAL; Future    3.

## 2018-07-17 NOTE — PATIENT INSTRUCTIONS
Patient Education        Gastroesophageal Reflux Disease (GERD): Care Instructions  Your Care Instructions    Gastroesophageal reflux disease (GERD) is the backward flow of stomach acid into the esophagus. The esophagus is the tube that leads from your throat to your stomach. A one-way valve prevents the stomach acid from moving up into this tube. When you have GERD, this valve does not close tightly enough. If you have mild GERD symptoms including heartburn, you may be able to control the problem with antacids or over-the-counter medicine. Changing your diet, losing weight, and making other lifestyle changes can also help reduce symptoms. Follow-up care is a key part of your treatment and safety. Be sure to make and go to all appointments, and call your doctor if you are having problems. It's also a good idea to know your test results and keep a list of the medicines you take. How can you care for yourself at home? · Take your medicines exactly as prescribed. Call your doctor if you think you are having a problem with your medicine. · Your doctor may recommend over-the-counter medicine. For mild or occasional indigestion, antacids, such as Tums, Gaviscon, Mylanta, or Maalox, may help. Your doctor also may recommend over-the-counter acid reducers, such as Pepcid AC, Tagamet HB, Zantac 75, or Prilosec. Read and follow all instructions on the label. If you use these medicines often, talk with your doctor. · Change your eating habits. ¨ It's best to eat several small meals instead of two or three large meals. ¨ After you eat, wait 2 to 3 hours before you lie down. ¨ Chocolate, mint, and alcohol can make GERD worse. ¨ Spicy foods, foods that have a lot of acid (like tomatoes and oranges), and coffee can make GERD symptoms worse in some people. If your symptoms are worse after you eat a certain food, you may want to stop eating that food to see if your symptoms get better.   · Do not smoke or chew tobacco. omeprazole? Omeprazole is a proton pump inhibitor that decreases the amount of acid produced in the stomach. Omeprazole is used to treat symptoms of gastroesophageal reflux disease (GERD) and other conditions caused by excess stomach acid. Omeprazole is also used to promote healing of erosive esophagitis (damage to your esophagus caused by stomach acid). Omeprazole may also be given together with antibiotics to treat gastric ulcer caused by infection with helicobacter pylori (H. pylori). Omeprazole is not for immediate relief of heartburn symptoms. Omeprazole may also be used for purposes not listed in this medication guide. What should I discuss with my healthcare provider before taking omeprazole? Heartburn is often confused with the first symptoms of a heart attack. Seek emergency medical attention if you have chest pain or heavy feeling, pain spreading to the arm or shoulder, nausea, sweating, and a general ill feeling. You should not use this medicine if you are allergic to omeprazole or to any benzimidazole medicine such as albendazole or mebendazole. Ask a doctor or pharmacist if it is safe for you to use omeprazole if you have other medical conditions, especially:  · liver disease;  · low levels of magnesium in your blood; or  · osteoporosis or low bone mineral density (osteopenia). Do not use over-the-counter omeprazole (Prilosec OTC) without the advice of a doctor if you have:  · trouble or pain with swallowing;  · bloody or black stools, vomit that looks like blood or coffee grounds;  · heartburn that has lasted for over 3 months;  · frequent chest pain, heartburn with wheezing;  · unexplained weight loss; or  · nausea or vomiting, stomach pain. Taking a proton pump inhibitor such as omeprazole may increase your risk of bone fracture in the hip, wrist, or spine.  This effect has occurred mostly in people who have taken the medication long term or at high doses, and in those who are age 48 and make sure you get the entire dose, add a little more water to the same glass, swirl gently and drink right away. This mixture can also be given through a nasogastric (NG) feeding tube using only a catheter-tipped syringe. Shake the syringe well, then attach it to the NG tube and push the plunger down to empty the syringe into the tube. Refill the syringe with water and flush the tube to wash the contents down. Use this medicine for the full prescribed length of time. Your symptoms may improve before your condition is completely cleared. If you use omeprazole for longer than 3 years, you could develop a vitamin B-12 deficiency. Talk to your doctor about how to manage this condition if you develop it. Call your doctor if your symptoms do not improve, or if they get worse while using omeprazole. Some conditions are treated with a combination of omeprazole and antibiotics. Use all medications as directed by your doctor. Read the medication guide or patient instructions provided with each medication. Do not change your doses or medication schedule without your doctor's advice. This medicine can cause unusual results with certain medical tests, and you may need to stop using the medicine for a short time before a test. Tell any doctor who treats you that you are using omeprazole. Store at room temperature away from moisture and heat. What happens if I miss a dose? Take the missed dose as soon as you remember. Skip the missed dose if it is almost time for your next scheduled dose. Do not  take extra medicine to make up the missed dose. What happens if I overdose? Seek emergency medical attention or call the Poison Help line at 1-442.771.7881. What should I avoid while taking omeprazole? This medicine can cause diarrhea, which may be a sign of a new infection. If you have diarrhea that is watery or bloody, call your doctor. Do not use anti-diarrhea medicine unless your doctor tells you to.   What are the all other medicines out of the reach of children, never share your medicines with others, and use this medication only for the indication prescribed. Every effort has been made to ensure that the information provided by [facility] [address] is accurate, up-to-date, and complete, but no guarantee is made to that effect. Drug information contained herein may be time sensitive. [facility] information has been compiled for use by healthcare practitioners and consumers in the [place] and therefore [facility] does not warrant that uses outside of the [place] are appropriate, unless specifically indicated otherwise. [facility]'s drug information does not endorse drugs, diagnose patients or recommend therapy. [facility]'s drug information is an informational resource designed to assist licensed healthcare practitioners in caring for their patients and/or to serve consumers viewing this service as a supplement to, and not a substitute for, the expertise, skill, knowledge and judgment of healthcare practitioners. The absence of a warning for a given drug or drug combination in no way should be construed to indicate that the drug or drug combination is safe, effective or appropriate for any given patient. [facility] does not assume any responsibility for any aspect of healthcare administered with the aid of information [facility] provides. The information contained herein is not intended to cover all possible uses, directions, precautions, warnings, drug interactions, allergic reactions, or adverse effects. If you have questions about the drugs you are taking, check with your doctor, nurse or pharmacist.  Copyright 4188-4700 [address]: 17.01. Revision date: 2/2/2015. Care instructions adapted under license by [facility] ([facility]).  If you have questions about a medical condition or this instruction, always ask your healthcare professional. [place] [address] any warranty or liability for your use of

## 2018-07-23 ENCOUNTER — HOSPITAL ENCOUNTER (OUTPATIENT)
Dept: VASCULAR LAB | Age: 78
Discharge: HOME | End: 2018-07-23
Payer: MEDICARE

## 2018-07-23 PROCEDURE — 93880 EXTRACRANIAL BILAT STUDY: CPT

## 2018-07-28 DIAGNOSIS — F41.9 ANXIETY: Primary | ICD-10-CM

## 2018-07-30 RX ORDER — LORAZEPAM 0.5 MG/1
TABLET ORAL
Qty: 180 TABLET | Refills: 0 | OUTPATIENT
Start: 2018-07-30 | End: 2018-10-22 | Stop reason: SDUPTHER

## 2018-07-30 NOTE — TELEPHONE ENCOUNTER
OK to phone prescription in to pharmacy. [facility] ([facility] and [facility] reports) for the last 12 months was requested and reviewed today. The results of the report was consistent with the current treatment plan.

## 2018-07-30 NOTE — TELEPHONE ENCOUNTER
Date of last refill of this med was 4/5, # of pills given 180 and # of refills given 0. Their next appointment is 8/20, the last date patient was seen was 4/10. Does patient have medication agreement on file? Yes  Has drug screen been done in last 12 months if needed? no  Has [facility] report been ran in last 90 days?  7/30/18 (must input date)

## 2018-08-03 RX ORDER — PRAVASTATIN SODIUM 20 MG
TABLET ORAL
Qty: 90 TABLET | Refills: 1 | Status: SHIPPED | OUTPATIENT
Start: 2018-08-03 | End: 2018-12-03 | Stop reason: SDUPTHER

## 2018-08-07 ENCOUNTER — TELEPHONE (OUTPATIENT)
Dept: FAMILY MEDICINE CLINIC | Age: 78
End: 2018-08-07

## 2018-08-08 ENCOUNTER — ANTI-COAG VISIT (OUTPATIENT)
Dept: PHARMACY | Age: 78
End: 2018-08-08
Payer: MEDICARE

## 2018-08-08 DIAGNOSIS — I48.0 INTERMITTENT ATRIAL FIBRILLATION (HCC): ICD-10-CM

## 2018-08-08 LAB — INR BLD: 2.9

## 2018-08-08 PROCEDURE — 85610 PROTHROMBIN TIME: CPT | Performed by: PHARMACIST

## 2018-08-08 PROCEDURE — 99211 OFF/OP EST MAY X REQ PHY/QHP: CPT | Performed by: PHARMACIST

## 2018-08-10 ENCOUNTER — APPOINTMENT (OUTPATIENT)
Dept: GENERAL RADIOLOGY | Age: 78
End: 2018-08-10
Payer: MEDICARE

## 2018-08-10 ENCOUNTER — HOSPITAL ENCOUNTER (EMERGENCY)
Age: 78
Discharge: HOME | End: 2018-08-10
Attending: EMERGENCY MEDICINE
Payer: MEDICARE

## 2018-08-10 VITALS
HEART RATE: 62 BPM | BODY MASS INDEX: 27.31 KG/M2 | RESPIRATION RATE: 16 BRPM | HEIGHT: 67 IN | TEMPERATURE: 97.8 F | SYSTOLIC BLOOD PRESSURE: 148 MMHG | OXYGEN SATURATION: 99 % | DIASTOLIC BLOOD PRESSURE: 78 MMHG | WEIGHT: 174 LBS

## 2018-08-10 DIAGNOSIS — S60.222A CONTUSION OF LEFT HAND, INITIAL ENCOUNTER: ICD-10-CM

## 2018-08-10 DIAGNOSIS — S80.02XA CONTUSION OF LEFT KNEE, INITIAL ENCOUNTER: Primary | ICD-10-CM

## 2018-08-10 DIAGNOSIS — S80.01XA CONTUSION OF RIGHT KNEE, INITIAL ENCOUNTER: ICD-10-CM

## 2018-08-10 LAB
A/G RATIO: 1.2 (ref 1.1–2.2)
ALBUMIN SERPL-MCNC: 3.9 G/DL (ref 3.4–5)
ALP BLD-CCNC: 82 U/L (ref 40–129)
ALT SERPL-CCNC: 21 U/L (ref 10–40)
ANION GAP SERPL CALCULATED.3IONS-SCNC: 12 MMOL/L (ref 3–16)
AST SERPL-CCNC: 19 U/L (ref 15–37)
BASOPHILS ABSOLUTE: 0.1 K/UL (ref 0–0.2)
BASOPHILS RELATIVE PERCENT: 1 %
BILIRUB SERPL-MCNC: 0.8 MG/DL (ref 0–1)
BILIRUBIN URINE: NEGATIVE
BLOOD, URINE: NEGATIVE
BUN BLDV-MCNC: 12 MG/DL (ref 7–20)
CALCIUM SERPL-MCNC: 9.3 MG/DL (ref 8.3–10.6)
CHLORIDE BLD-SCNC: 102 MMOL/L (ref 99–110)
CLARITY: CLEAR
CO2: 24 MMOL/L (ref 21–32)
COLOR: YELLOW
CREAT SERPL-MCNC: 0.8 MG/DL (ref 0.6–1.2)
EOSINOPHILS ABSOLUTE: 0 K/UL (ref 0–0.6)
EOSINOPHILS RELATIVE PERCENT: 0.3 %
GFR AFRICAN AMERICAN: >60
GFR NON-AFRICAN AMERICAN: >60
GLOBULIN: 3.3 G/DL
GLUCOSE BLD-MCNC: 113 MG/DL (ref 70–99)
GLUCOSE URINE: NEGATIVE MG/DL
HCT VFR BLD CALC: 42.1 % (ref 36–48)
HEMOGLOBIN: 13.7 G/DL (ref 12–16)
INR BLD: 1.86 (ref 0.86–1.14)
KETONES, URINE: NEGATIVE MG/DL
LEUKOCYTE ESTERASE, URINE: NEGATIVE
LYMPHOCYTES ABSOLUTE: 1.8 K/UL (ref 1–5.1)
LYMPHOCYTES RELATIVE PERCENT: 17.9 %
MCH RBC QN AUTO: 28.8 PG (ref 26–34)
MCHC RBC AUTO-ENTMCNC: 32.4 G/DL (ref 31–36)
MCV RBC AUTO: 88.8 FL (ref 80–100)
MICROSCOPIC EXAMINATION: NORMAL
MONOCYTES ABSOLUTE: 0.7 K/UL (ref 0–1.3)
MONOCYTES RELATIVE PERCENT: 6.5 %
NEUTROPHILS ABSOLUTE: 7.6 K/UL (ref 1.7–7.7)
NEUTROPHILS RELATIVE PERCENT: 74.3 %
NITRITE, URINE: NEGATIVE
PDW BLD-RTO: 14.7 % (ref 12.4–15.4)
PH UA: 6
PLATELET # BLD: 263 K/UL (ref 135–450)
PMV BLD AUTO: 9.8 FL (ref 5–10.5)
POTASSIUM SERPL-SCNC: 4.3 MMOL/L (ref 3.5–5.1)
PROTEIN UA: NEGATIVE MG/DL
PROTHROMBIN TIME: 20.8 SEC (ref 9.8–13)
RBC # BLD: 4.75 M/UL (ref 4–5.2)
SODIUM BLD-SCNC: 138 MMOL/L (ref 136–145)
SPECIFIC GRAVITY UA: <=1.005
TOTAL PROTEIN: 7.2 G/DL (ref 6.4–8.2)
URINE TYPE: NORMAL
UROBILINOGEN, URINE: 0.2 E.U./DL
WBC # BLD: 10.3 K/UL (ref 4–11)

## 2018-08-10 PROCEDURE — 36415 COLL VENOUS BLD VENIPUNCTURE: CPT

## 2018-08-10 PROCEDURE — 81003 URINALYSIS AUTO W/O SCOPE: CPT

## 2018-08-10 PROCEDURE — 85025 COMPLETE CBC W/AUTO DIFF WBC: CPT

## 2018-08-10 PROCEDURE — 71045 X-RAY EXAM CHEST 1 VIEW: CPT

## 2018-08-10 PROCEDURE — 73130 X-RAY EXAM OF HAND: CPT

## 2018-08-10 PROCEDURE — 80053 COMPREHEN METABOLIC PANEL: CPT

## 2018-08-10 PROCEDURE — 73562 X-RAY EXAM OF KNEE 3: CPT

## 2018-08-10 PROCEDURE — 99284 EMERGENCY DEPT VISIT MOD MDM: CPT

## 2018-08-10 PROCEDURE — 85610 PROTHROMBIN TIME: CPT

## 2018-08-10 ASSESSMENT — ENCOUNTER SYMPTOMS
NAUSEA: 0
VOMITING: 0
ABDOMINAL PAIN: 0
DIARRHEA: 0
SHORTNESS OF BREATH: 0

## 2018-08-10 ASSESSMENT — PAIN SCALES - GENERAL: PAINLEVEL_OUTOF10: 6

## 2018-08-10 ASSESSMENT — PAIN DESCRIPTION - ORIENTATION: ORIENTATION: RIGHT;LEFT

## 2018-08-10 ASSESSMENT — PAIN DESCRIPTION - PAIN TYPE: TYPE: ACUTE PAIN

## 2018-08-10 ASSESSMENT — PAIN DESCRIPTION - LOCATION: LOCATION: KNEE

## 2018-08-10 NOTE — ED PROVIDER NOTES
Kell Dubois is a [de-identified] y.o. female presents with fall, knee pain. She fell this morning while she was walking down the steps. She states that she got to the last step and her legs gave out and she fell onto her knee bilaterally in the gravel. . She does have an appointment with her PCP on the 20th. Denies difficulty urinating, fevers, chills, n/v/d, headache, syncope. She states that she has been having problems with weakness with her right knee for years. She has had no numbness no weakness no incontinence. She states again she's had trouble with the right leg for years. After she fell she and her  drove to the doctor's office in fact the patient drove. And after arriving at the doctor's office they sent her to the ER. Patient had her INR checked 2 days ago. Patient states she did not strike her head and does not have a headache. HPI    Review of Systems   Constitutional: Negative for chills and fever. HENT: Negative for congestion. Eyes: Negative for visual disturbance. Respiratory: Negative for shortness of breath. Cardiovascular: Negative for chest pain. Gastrointestinal: Negative for abdominal pain, diarrhea, nausea and vomiting. Genitourinary: Negative for difficulty urinating. Musculoskeletal: Negative for neck pain. Skin: Negative for wound. Neurological: Negative for syncope, light-headedness and headaches. PAST MEDICAL HISTORY   has a past medical history of Adrenal mass ([address] (3/12); Anxiety; Colon polyp; Osteoarthritis (2/12); Osteoporosis; Routine health maintenance; and Ureteral obstruction, right (3/12). PAST SURGICAL HISTORY   has a past surgical history that includes hemicolectomy (3/11) and Cataract removal (Bilateral, May, 13). FAMILY HISTORY  family history includes Cancer (age of onset: 46) in her mother; Deep Vein Thrombosis in her father. SOCIAL HISTORY   reports that she quit smoking about 12 months ago.  She has a 10.00 pack-year smoking history. She has never used smokeless tobacco. She reports that she does not drink alcohol or use drugs. HOME MEDICATIONS     Prior to Admission medications    Medication Sig Start Date End Date Taking? Authorizing Provider   pravastatin (PRAVACHOL) 20 MG tablet TAKE 1 TABLET BY MOUTH NIGHTLY 8/3/18   Angela Sullivan MD   LORazepam (ATIVAN) 0.5 MG tablet TAKE 1 TABLET BY MOUTH TWICE A DAY 7/30/18 10/28/18  Angela Sullivan MD   omeprazole (PRILOSEC) 20 MG delayed release capsule Take 1 capsule by mouth daily 7/17/18   ROBERT Morocho CNP   amLODIPine (NORVASC) 2.5 MG tablet TAKE 1 TABLET BY MOUTH TWICE A DAY 6/18/18   Angela Sullivan MD   warfarin (COUMADIN) 2.5 MG tablet TAKE 2 TABLETS ON MON WED FRID AND 1 TABLET ALL OTHER DAYS 3/22/18   Angela Sullivan MD   digoxin [facility]) 125 MCG tablet Take 1 tablet by mouth daily 12/18/17   Angela Sullivan MD   acetaminophen (TYLENOL) 500 MG tablet Take 1-2 tablets by mouth 3 times daily as needed for Pain 8/11/17   Angela Sullivan MD   calcium carbonate 600 MG TABS tablet Take 1 tablet by mouth daily. Historical Provider, MD        ALLERGIES  is allergic to beta adrenergic blockers. Ht 5' 7\" (1.702 m)   Wt 174 lb (78.9 kg)   BMI 27.25 kg/m²      Physical Exam   Constitutional: She is oriented to person, place, and time. She appears well-developed. No distress. HENT:   Head: Normocephalic and atraumatic. Right Ear: External ear normal.   Left Ear: External ear normal.   Mouth/Throat: Oropharynx is clear and moist. No oropharyngeal exudate, posterior oropharyngeal edema or posterior oropharyngeal erythema. Eyes: Conjunctivae and EOM are normal. Pupils are equal, round, and reactive to light. Right eye exhibits no discharge. Left eye exhibits no discharge. Neck: Normal range of motion. Neck supple. No JVD present. Cardiovascular: Normal rate, regular rhythm, normal heart sounds and intact distal pulses. Exam reveals no gallop and no friction rub. Basophils # 0.1 0.0 - 0.2 K/uL   Comprehensive Metabolic Panel   Result Value Ref Range    Sodium 138 136 - 145 mmol/L    Potassium 4.3 3.5 - 5.1 mmol/L    Chloride 102 99 - 110 mmol/L    CO2 24 21 - 32 mmol/L    Anion Gap 12 3 - 16    Glucose 113 (H) 70 - 99 mg/dL    BUN 12 7 - 20 mg/dL    CREATININE 0.8 0.6 - 1.2 mg/dL    GFR Non-African American >60 >60    GFR African American >60 >60    Calcium 9.3 8.3 - 10.6 mg/dL    Total Protein 7.2 6.4 - 8.2 g/dL    Alb 3.9 3.4 - 5.0 g/dL    Albumin/Globulin Ratio 1.2 1.1 - 2.2    Total Bilirubin 0.8 0.0 - 1.0 mg/dL    Alkaline Phosphatase 82 40 - 129 U/L    ALT 21 10 - 40 U/L    AST 19 15 - 37 U/L    Globulin 3.3 g/dL   Urinalysis   Result Value Ref Range    Color, UA Yellow Straw/Yellow    Clarity, UA Clear Clear    Glucose, Ur Negative Negative mg/dL    Bilirubin Urine Negative Negative    Ketones, Urine Negative Negative mg/dL    Specific Gravity, UA <=1.005 1.005 - 1.030    Blood, Urine Negative Negative    pH, UA 6.0 5.0 - 8.0    Protein, UA Negative Negative mg/dL    Urobilinogen, Urine 0.2 <2.0 E.U./dL    Nitrite, Urine Negative Negative    Leukocyte Esterase, Urine Negative Negative    Microscopic Examination Not Indicated     Urine Type Not Specified    Protime-INR   Result Value Ref Range    Protime 20.8 (H) 9.8 - 13.0 sec    INR 1.86 (H) 0.86 - 1.14       Radiology  The following radiographic studies were viewed by myself. Radiologist's interpretation:    Xr Hand Left (min 3 Views)    Result Date: 8/10/2018  EXAMINATION: 3 XRAY VIEWS OF THE LEFT HAND 8/10/2018 11:44 am COMPARISON: None. HISTORY: ORDERING SYSTEM PROVIDED HISTORY: fall TECHNOLOGIST PROVIDED HISTORY: Reason for exam:->fall Ordering Physician Provided Reason for Exam: left hand pain Acuity: Acute Type of Exam: Initial Mechanism of Injury: fall FINDINGS: Bone mineralization appears abnormally low. Osseous structures of the left hand appear intact and aligned normally.   Joint spaces are mildly degenerated. No retained radiopaque foreign body. Soft tissues appear unremarkable. No acute osseous abnormality evident. Bones appear osteoporotic. Bone densitometry correlation may be considered if not performed recently. Mild osteoarthritis. Follow-up imaging recommended if pain persists or worsens following conservative management. Xr Knee Left (3 Views)    Result Date: 8/10/2018  EXAMINATION: 3 XRAY VIEWS OF THE LEFT KNEE; 3 XRAY VIEWS OF THE RIGHT KNEE 8/10/2018 11:43 am COMPARISON: None. HISTORY: ORDERING SYSTEM PROVIDED HISTORY: fall TECHNOLOGIST PROVIDED HISTORY: Reason for exam:->fall Ordering Physician Provided Reason for Exam: bilateral knee pain Acuity: Acute Type of Exam: Initial Mechanism of Injury: fall FINDINGS: Osseous structures of the knees are intact and align normally. Mild tricompartmental osteoarthritis is noted bilateral. No osteochondral defect is evident. No retained radiopaque foreign body. No evidence of joint effusion. Mild osteoarthritis bilateral knees. Otherwise unremarkable radiographs right and left knee. Xr Knee Right (3 Views)    Result Date: 8/10/2018  EXAMINATION: 3 XRAY VIEWS OF THE LEFT KNEE; 3 XRAY VIEWS OF THE RIGHT KNEE 8/10/2018 11:43 am COMPARISON: None. HISTORY: ORDERING SYSTEM PROVIDED HISTORY: fall TECHNOLOGIST PROVIDED HISTORY: Reason for exam:->fall Ordering Physician Provided Reason for Exam: bilateral knee pain Acuity: Acute Type of Exam: Initial Mechanism of Injury: fall FINDINGS: Osseous structures of the knees are intact and align normally. Mild tricompartmental osteoarthritis is noted bilateral. No osteochondral defect is evident. No retained radiopaque foreign body. No evidence of joint effusion. Mild osteoarthritis bilateral knees. Otherwise unremarkable radiographs right and left knee. Xr Chest Portable    Result Date: 8/10/2018  EXAMINATION: SINGLE XRAY VIEW OF THE CHEST 8/10/2018 11:43 am COMPARISON: None.  HISTORY: ORDERING problems and are agreeable to plan. Discussed results, diagnosis and plan with patient and/or family. Questions addressed. Disposition and follow-up agreed upon. Specific discharge instructions explained. The patient and/or family and I have discussed the diagnosis and risks, and we agree with discharging home to follow-up with their primary care, specialist or referral doctor. We also discussed returning to the Emergency Department immediately if new or worsening symptoms occur. We have discussed the symptoms which are most concerning that necessitate immediate return. This document serves as a record of the services and decisions personally performed by Mahi Farias MD. It was created on the provider's behalf by Rob Ortega, a trained medical scribe. The creation of this document is based on the provider's statements to the medical scribe. The document has been reviewed and approved by the provider. Shannan Deidra, 11:19 AM 8/10/18 scribing for and in the presence of Mahi Farias MD.        This dictation was generated by voice recognition computer software. Although all attempts are made to edit the dictation for accuracy, there may be errors in the transcription that are not intended. The Clinical Impression is bilateral knee contusions, left hand contusion.         Mahi Farias MD  08/10/18 [id number]

## 2018-08-13 ENCOUNTER — OFFICE VISIT (OUTPATIENT)
Dept: FAMILY MEDICINE CLINIC | Age: 78
End: 2018-08-13

## 2018-08-13 VITALS
SYSTOLIC BLOOD PRESSURE: 118 MMHG | HEART RATE: 54 BPM | BODY MASS INDEX: 26.78 KG/M2 | WEIGHT: 171 LBS | DIASTOLIC BLOOD PRESSURE: 70 MMHG | TEMPERATURE: 97.5 F | OXYGEN SATURATION: 96 %

## 2018-08-13 DIAGNOSIS — M25.562 ACUTE PAIN OF LEFT KNEE: Primary | ICD-10-CM
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
## 2018-08-13 PROCEDURE — 99213 OFFICE O/P EST LOW 20 MIN: CPT | Performed by: FAMILY MEDICINE

## 2018-08-13 NOTE — PATIENT INSTRUCTIONS
Please compare this printed medication list with your medications at home to be sure they are the same. If you have any medications that are different please contact us immediately at [phone]. Also review your allergies that we have listed, these may also include medications that you have not been able to tolerate, make sure everything listed is correct. If you have any allergies that are different please contact us immediately at [phone].

## 2018-08-13 NOTE — PROGRESS NOTES
Assessment and plan  1. Acute pain of left knee  Suspect only contusion. Continue to use walker, stay as active as possible    2. Fall (on) (from) other stairs and steps, initial encounter  Recommended physical therapy. She defers    Return to clinic or call prn if these symptoms worsen or fail to improve and resolve as discussed    Subjective  Patient returns for ER follow-up.  3 days ago she was going up the steps at a [employer] when she did lift her leg high enough tripped and fell forward, landing on her left knee. The pain was significant so she was taken to the emergency room. X-ray there showed mild osteoarthritis. She has been using a walker, and she and her daughter reports she's 80-90% better. She is not needing to take anything for pain now. She admits she does not always lift her leg as high as what she should, at times feels a little weak there. Objective  /70   Pulse 54   Temp 97.5 °F (36.4 °C) (Oral)   Wt 171 lb (77.6 kg)   SpO2 96%   BMI 26.78 kg/m²   Patient no acute distress. Examination of her right knee finds no effusion or ecchymosis. Examination of her left knee finds some soft tissue swelling over the patella with some ecchymosis. There does not seem to be significant tenderness over the patella. She may have a slight effusion but nothing significant. No tenderness of the joint space. Drawer sign is negative. She does seem to have some subtle weakness of both quadriceps    Kedar Jean MD    The note was completed using Dragon voice recognition transcription. Every effort was made to ensure accuracy; however, inadvertent  transcription errors may be present despite my best efforts to edit errors.

## 2018-08-20 ENCOUNTER — OFFICE VISIT (OUTPATIENT)
Dept: FAMILY MEDICINE CLINIC | Age: 78
End: 2018-08-20

## 2018-08-20 VITALS
DIASTOLIC BLOOD PRESSURE: 70 MMHG | OXYGEN SATURATION: 96 % | SYSTOLIC BLOOD PRESSURE: 134 MMHG | HEART RATE: 66 BPM | BODY MASS INDEX: 26.78 KG/M2 | WEIGHT: 171 LBS | TEMPERATURE: 98.1 F

## 2018-08-20 DIAGNOSIS — I10 BENIGN ESSENTIAL HYPERTENSION: Primary | ICD-10-CM

## 2018-08-20 DIAGNOSIS — I48.0 INTERMITTENT ATRIAL FIBRILLATION (HCC): ICD-10-CM

## 2018-08-20 DIAGNOSIS — F41.9 ANXIETY: ICD-10-CM

## 2018-08-20 PROCEDURE — 99214 OFFICE O/P EST MOD 30 MIN: CPT | Performed by: FAMILY MEDICINE

## 2018-08-20 NOTE — PATIENT INSTRUCTIONS
Please read the healthy family handout that you were given and share it with your family. Please compare this printed medication list with your medications at home to be sure they are the same. If you have any medications that are different please contact us immediately at [phone]. Also review your allergies that we have listed, these may also include medications that you have not been able to tolerate, make sure everything listed is correct. If you have any allergies that are different please contact us immediately at [phone].

## 2018-08-27 ENCOUNTER — NURSE ONLY (OUTPATIENT)
Dept: FAMILY MEDICINE CLINIC | Age: 78
End: 2018-08-27

## 2018-08-27 ENCOUNTER — TELEPHONE (OUTPATIENT)
Dept: FAMILY MEDICINE CLINIC | Age: 78
End: 2018-08-27

## 2018-08-27 DIAGNOSIS — R39.9 UTI SYMPTOMS: Primary | ICD-10-CM

## 2018-08-27 LAB
BILIRUBIN, POC: ABNORMAL
BLOOD URINE, POC: ABNORMAL
CLARITY, POC: CLEAR
COLOR, POC: ABNORMAL
GLUCOSE URINE, POC: ABNORMAL
KETONES, POC: ABNORMAL
LEUKOCYTE EST, POC: ABNORMAL
NITRITE, POC: ABNORMAL
PH, POC: 7
PROTEIN, POC: ABNORMAL
SPECIFIC GRAVITY, POC: 1.02
UROBILINOGEN, POC: 2

## 2018-08-27 PROCEDURE — 81002 URINALYSIS NONAUTO W/O SCOPE: CPT | Performed by: FAMILY MEDICINE

## 2018-08-29 ENCOUNTER — TELEPHONE (OUTPATIENT)
Dept: FAMILY MEDICINE CLINIC | Age: 78
End: 2018-08-29

## 2018-08-29 DIAGNOSIS — R41.3 MEMORY LOSS: Primary | ICD-10-CM

## 2018-08-29 RX ORDER — CIPROFLOXACIN 250 MG/1
250 TABLET, FILM COATED ORAL 2 TIMES DAILY
Qty: 14 TABLET | Refills: 0 | Status: SHIPPED | OUTPATIENT
Start: 2018-08-29 | End: 2018-09-05

## 2018-08-29 NOTE — TELEPHONE ENCOUNTER
Ismael Medina from the [facility] calling stating patient's family wanting to admit patient for long term care, wanting to talk to you to see if you feel would be appropriate family states she has dementia worse in the evening and she \"wonders\" they do not have lock down area just wanting the best for the patient, if you feel ok would need order for patient, if you would like to discuss can call [phone], thank you.

## 2018-08-30 LAB
ORGANISM: ABNORMAL
URINE CULTURE, ROUTINE: ABNORMAL
URINE CULTURE, ROUTINE: ABNORMAL

## 2018-08-31 ENCOUNTER — TELEPHONE (OUTPATIENT)
Dept: FAMILY MEDICINE CLINIC | Age: 78
End: 2018-08-31

## 2018-08-31 PROBLEM — F02.80 LATE ONSET ALZHEIMER'S DISEASE WITHOUT BEHAVIORAL DISTURBANCE (HCC): Status: ACTIVE | Noted: 2018-08-31

## 2018-08-31 PROBLEM — R41.3 MEMORY LOSS: Status: RESOLVED | Noted: 2018-08-29 | Resolved: 2018-08-31

## 2018-08-31 PROBLEM — G30.1 LATE ONSET ALZHEIMER'S DISEASE WITHOUT BEHAVIORAL DISTURBANCE (HCC): Status: ACTIVE | Noted: 2018-08-31

## 2018-09-05 ENCOUNTER — TELEPHONE (OUTPATIENT)
Dept: FAMILY MEDICINE CLINIC | Age: 78
End: 2018-09-05

## 2018-09-05 ENCOUNTER — ANTI-COAG VISIT (OUTPATIENT)
Dept: PHARMACY | Age: 78
End: 2018-09-05
Payer: MEDICARE

## 2018-09-05 DIAGNOSIS — I48.0 INTERMITTENT ATRIAL FIBRILLATION (HCC): ICD-10-CM

## 2018-09-05 LAB — INR BLD: 3.5

## 2018-09-05 PROCEDURE — 99211 OFF/OP EST MAY X REQ PHY/QHP: CPT | Performed by: FAMILY MEDICINE

## 2018-09-05 PROCEDURE — 85610 PROTHROMBIN TIME: CPT | Performed by: FAMILY MEDICINE

## 2018-09-19 ENCOUNTER — ANTI-COAG VISIT (OUTPATIENT)
Dept: PHARMACY | Age: 78
End: 2018-09-19
Payer: MEDICARE

## 2018-09-19 DIAGNOSIS — I48.0 INTERMITTENT ATRIAL FIBRILLATION (HCC): ICD-10-CM

## 2018-09-19 LAB — INTERNATIONAL NORMALIZATION RATIO, POC: 4

## 2018-09-19 PROCEDURE — 85610 PROTHROMBIN TIME: CPT | Performed by: PHARMACIST

## 2018-09-19 PROCEDURE — 99211 OFF/OP EST MAY X REQ PHY/QHP: CPT | Performed by: PHARMACIST

## 2018-09-28 DIAGNOSIS — Z23 NEED FOR PROPHYLACTIC VACCINATION AND INOCULATION AGAINST INFLUENZA: Primary | ICD-10-CM

## 2018-09-28 DIAGNOSIS — F41.9 ANXIETY: Primary | ICD-10-CM

## 2018-10-01 RX ORDER — LORAZEPAM 1 MG/1
TABLET ORAL
Qty: 90 TABLET | Refills: 0 | OUTPATIENT
Start: 2018-10-01 | End: 2018-10-22 | Stop reason: DRUGHIGH

## 2018-10-02 ENCOUNTER — NURSE ONLY (OUTPATIENT)
Dept: FAMILY MEDICINE CLINIC | Age: 78
End: 2018-10-02
Payer: MEDICARE

## 2018-10-02 DIAGNOSIS — Z23 NEED FOR PROPHYLACTIC VACCINATION AND INOCULATION AGAINST INFLUENZA: ICD-10-CM

## 2018-10-02 PROCEDURE — 90662 IIV NO PRSV INCREASED AG IM: CPT | Performed by: FAMILY MEDICINE

## 2018-10-02 PROCEDURE — G0008 ADMIN INFLUENZA VIRUS VAC: HCPCS | Performed by: FAMILY MEDICINE

## 2018-10-02 NOTE — PROGRESS NOTES
Vaccine Information Sheet, \"Influenza - Inactivated\"  given to Corinna Angeles, or parent/legal guardian of  Corinna Angeles and verbalized understanding. Patient responses:    Have you ever had a reaction to a flu vaccine? No  Are you able to eat eggs without adverse effects? Yes  Do you have any current illness? No  Have you ever had Guillian [place] Syndrome? No    Flu vaccine given per order. Please see immunization tab.

## 2018-10-03 ENCOUNTER — ANTI-COAG VISIT (OUTPATIENT)
Dept: PHARMACY | Age: 78
End: 2018-10-03
Payer: MEDICARE

## 2018-10-03 DIAGNOSIS — I48.0 INTERMITTENT ATRIAL FIBRILLATION (HCC): ICD-10-CM

## 2018-10-03 LAB — INTERNATIONAL NORMALIZATION RATIO, POC: 1.9

## 2018-10-03 PROCEDURE — 85610 PROTHROMBIN TIME: CPT | Performed by: PHARMACIST

## 2018-10-03 PROCEDURE — 99211 OFF/OP EST MAY X REQ PHY/QHP: CPT | Performed by: PHARMACIST

## 2018-10-03 NOTE — PROGRESS NOTES
Ms. Brown Stefanie is here for management of anticoagulation for AFib. PMH also significant for HTN and HLD. She presents today w/out complaint. Pt verifies dosing regimen as listed above. Pt denies s/s bleeding/bruising/swelling/SOB. No BRBPR. No melena. Denies missed doses. Reviewed home medications. No changes in RX/OTCs/Herbal medications. Reviewed dietary concerns   Denies EToH and tobacco use. She has been eating a lot of greens lately due to previous high INRs. Will have her cut back slightly. INR 1.9 is within acceptable therapeutic range of 2-3  Recommend to continue 2.5 mg daily except 1.25 mg Q Sun  Patient has 2.5 mg tablets. Will continue to monitor and check INR in 3 weeks. Dosing reminder card given with phone number, appointment date and time.    Return to clinic: 10/24 @ 9:15 am    Chaz Major, PharmD 9:25 AM 10/3/18

## 2018-10-10 ENCOUNTER — TELEPHONE (OUTPATIENT)
Dept: FAMILY MEDICINE CLINIC | Age: 78
End: 2018-10-10

## 2018-10-10 ENCOUNTER — NURSE ONLY (OUTPATIENT)
Dept: FAMILY MEDICINE CLINIC | Age: 78
End: 2018-10-10
Payer: MEDICARE

## 2018-10-10 DIAGNOSIS — R31.9 HEMATURIA, UNSPECIFIED TYPE: Primary | ICD-10-CM

## 2018-10-10 LAB
BILIRUBIN, POC: ABNORMAL
BLOOD URINE, POC: ABNORMAL
CLARITY, POC: CLEAR
COLOR, POC: ABNORMAL
GLUCOSE URINE, POC: ABNORMAL
KETONES, POC: ABNORMAL
LEUKOCYTE EST, POC: ABNORMAL
NITRITE, POC: ABNORMAL
PH, POC: 5.5
PROTEIN, POC: ABNORMAL
SPECIFIC GRAVITY, POC: 1.02
UROBILINOGEN, POC: 1

## 2018-10-10 PROCEDURE — 81002 URINALYSIS NONAUTO W/O SCOPE: CPT | Performed by: FAMILY MEDICINE

## 2018-10-10 NOTE — TELEPHONE ENCOUNTER
Granddaughter thinks patient may have a uti, wanted to see if she could bring in urine specimen to be checked

## 2018-10-12 LAB — URINE CULTURE, ROUTINE: NORMAL

## 2018-10-18 DIAGNOSIS — F41.9 ANXIETY: ICD-10-CM

## 2018-10-22 RX ORDER — LORAZEPAM 0.5 MG/1
0.5 TABLET ORAL 2 TIMES DAILY
Qty: 180 TABLET | Refills: 0 | OUTPATIENT
Start: 2018-10-22 | End: 2019-01-23 | Stop reason: SDUPTHER

## 2018-10-24 ENCOUNTER — ANTI-COAG VISIT (OUTPATIENT)
Dept: PHARMACY | Age: 78
End: 2018-10-24
Payer: MEDICARE

## 2018-10-24 DIAGNOSIS — I48.0 INTERMITTENT ATRIAL FIBRILLATION (HCC): ICD-10-CM

## 2018-10-24 LAB — INTERNATIONAL NORMALIZATION RATIO, POC: 1.9

## 2018-10-24 PROCEDURE — 85610 PROTHROMBIN TIME: CPT | Performed by: PHARMACIST

## 2018-10-24 PROCEDURE — 99211 OFF/OP EST MAY X REQ PHY/QHP: CPT | Performed by: PHARMACIST

## 2018-11-01 DIAGNOSIS — K21.9 GASTROESOPHAGEAL REFLUX DISEASE WITHOUT ESOPHAGITIS: ICD-10-CM

## 2018-11-01 RX ORDER — OMEPRAZOLE 20 MG/1
20 CAPSULE, DELAYED RELEASE ORAL DAILY
Qty: 30 CAPSULE | Refills: 5 | Status: SHIPPED | OUTPATIENT
Start: 2018-11-01 | End: 2019-04-03 | Stop reason: SDUPTHER

## 2018-11-21 ENCOUNTER — ANTI-COAG VISIT (OUTPATIENT)
Dept: PHARMACY | Age: 78
End: 2018-11-21
Payer: MEDICARE

## 2018-11-21 DIAGNOSIS — I48.0 INTERMITTENT ATRIAL FIBRILLATION (HCC): ICD-10-CM

## 2018-11-21 LAB — INR BLD: 1.9

## 2018-11-21 PROCEDURE — 85610 PROTHROMBIN TIME: CPT | Performed by: PHARMACIST

## 2018-11-21 PROCEDURE — 99211 OFF/OP EST MAY X REQ PHY/QHP: CPT | Performed by: PHARMACIST

## 2018-11-23 RX ORDER — DIGOXIN 125 MCG
125 TABLET ORAL DAILY
Qty: 90 TABLET | Refills: 1 | Status: SHIPPED | OUTPATIENT
Start: 2018-11-23 | End: 2019-04-03 | Stop reason: SDUPTHER

## 2018-12-03 ENCOUNTER — OFFICE VISIT (OUTPATIENT)
Dept: FAMILY MEDICINE CLINIC | Age: 78
End: 2018-12-03
Payer: MEDICARE

## 2018-12-03 VITALS
DIASTOLIC BLOOD PRESSURE: 78 MMHG | WEIGHT: 164 LBS | OXYGEN SATURATION: 95 % | TEMPERATURE: 97.5 F | HEART RATE: 64 BPM | BODY MASS INDEX: 25.69 KG/M2 | SYSTOLIC BLOOD PRESSURE: 132 MMHG

## 2018-12-03 DIAGNOSIS — F02.80 LATE ONSET ALZHEIMER'S DISEASE WITHOUT BEHAVIORAL DISTURBANCE (HCC): ICD-10-CM

## 2018-12-03 DIAGNOSIS — G30.1 LATE ONSET ALZHEIMER'S DISEASE WITHOUT BEHAVIORAL DISTURBANCE (HCC): ICD-10-CM

## 2018-12-03 DIAGNOSIS — I48.0 INTERMITTENT ATRIAL FIBRILLATION (HCC): Primary | ICD-10-CM

## 2018-12-03 DIAGNOSIS — I10 BENIGN ESSENTIAL HYPERTENSION: ICD-10-CM

## 2018-12-03 DIAGNOSIS — F41.9 ANXIETY: ICD-10-CM

## 2018-12-03 PROBLEM — R41.3 MEMORY LOSS: Status: RESOLVED | Noted: 2018-08-29 | Resolved: 2018-12-03

## 2018-12-03 LAB
DIGOXIN LEVEL: 1.3 NG/ML (ref 0.8–2)
TSH REFLEX: 2.55 UIU/ML (ref 0.27–4.2)

## 2018-12-03 PROCEDURE — 99214 OFFICE O/P EST MOD 30 MIN: CPT | Performed by: FAMILY MEDICINE

## 2018-12-03 RX ORDER — PRAVASTATIN SODIUM 20 MG
TABLET ORAL
Qty: 90 TABLET | Refills: 1 | Status: SHIPPED | OUTPATIENT
Start: 2018-12-03 | End: 2019-04-03 | Stop reason: SDUPTHER

## 2018-12-03 RX ORDER — WARFARIN SODIUM 2.5 MG/1
TABLET ORAL
Qty: 180 TABLET | Refills: 1 | Status: SHIPPED | OUTPATIENT
Start: 2018-12-03 | End: 2018-12-24

## 2018-12-03 ASSESSMENT — PATIENT HEALTH QUESTIONNAIRE - PHQ9
SUM OF ALL RESPONSES TO PHQ QUESTIONS 1-9: 0
SUM OF ALL RESPONSES TO PHQ9 QUESTIONS 1 & 2: 0
1. LITTLE INTEREST OR PLEASURE IN DOING THINGS: 0
2. FEELING DOWN, DEPRESSED OR HOPELESS: 0
SUM OF ALL RESPONSES TO PHQ QUESTIONS 1-9: 0

## 2018-12-06 LAB — METHYLMALONIC ACID: 0.32 UMOL/L (ref 0–0.4)

## 2018-12-11 RX ORDER — AMLODIPINE BESYLATE 2.5 MG/1
TABLET ORAL
Qty: 180 TABLET | Refills: 1 | Status: SHIPPED | OUTPATIENT
Start: 2018-12-11 | End: 2019-04-03 | Stop reason: SDUPTHER

## 2018-12-19 ENCOUNTER — ANTI-COAG VISIT (OUTPATIENT)
Dept: PHARMACY | Age: 78
End: 2018-12-19
Payer: MEDICARE

## 2018-12-19 DIAGNOSIS — I48.0 INTERMITTENT ATRIAL FIBRILLATION (HCC): ICD-10-CM

## 2018-12-19 LAB — INTERNATIONAL NORMALIZATION RATIO, POC: 1.6

## 2018-12-19 PROCEDURE — 99211 OFF/OP EST MAY X REQ PHY/QHP: CPT | Performed by: PHARMACIST

## 2018-12-19 PROCEDURE — 85610 PROTHROMBIN TIME: CPT | Performed by: PHARMACIST

## 2018-12-24 RX ORDER — WARFARIN SODIUM 2.5 MG/1
TABLET ORAL
Qty: 90 TABLET | Refills: 0 | Status: SHIPPED | OUTPATIENT
Start: 2018-12-24 | End: 2019-04-03 | Stop reason: SDUPTHER

## 2019-01-02 ENCOUNTER — ANTI-COAG VISIT (OUTPATIENT)
Dept: PHARMACY | Age: 79
End: 2019-01-02
Payer: MEDICARE

## 2019-01-02 DIAGNOSIS — I48.0 INTERMITTENT ATRIAL FIBRILLATION (HCC): ICD-10-CM

## 2019-01-02 LAB — INTERNATIONAL NORMALIZATION RATIO, POC: 2.2

## 2019-01-02 PROCEDURE — 85610 PROTHROMBIN TIME: CPT | Performed by: PHARMACIST

## 2019-01-02 PROCEDURE — 99211 OFF/OP EST MAY X REQ PHY/QHP: CPT | Performed by: PHARMACIST

## 2019-01-23 DIAGNOSIS — F41.9 ANXIETY: ICD-10-CM

## 2019-01-24 RX ORDER — LORAZEPAM 0.5 MG/1
TABLET ORAL
Qty: 180 TABLET | Refills: 0 | Status: SHIPPED | OUTPATIENT
Start: 2019-01-24 | End: 2019-04-03 | Stop reason: SDUPTHER

## 2019-02-04 ENCOUNTER — ANTI-COAG VISIT (OUTPATIENT)
Dept: PHARMACY | Age: 79
End: 2019-02-04
Payer: MEDICARE

## 2019-02-04 DIAGNOSIS — I48.0 INTERMITTENT ATRIAL FIBRILLATION (HCC): ICD-10-CM

## 2019-02-04 LAB — INTERNATIONAL NORMALIZATION RATIO, POC: 2.2

## 2019-02-04 PROCEDURE — 99211 OFF/OP EST MAY X REQ PHY/QHP: CPT | Performed by: PHARMACIST

## 2019-02-04 PROCEDURE — 85610 PROTHROMBIN TIME: CPT | Performed by: PHARMACIST

## 2019-03-04 ENCOUNTER — ANTI-COAG VISIT (OUTPATIENT)
Dept: PHARMACY | Age: 79
End: 2019-03-04
Payer: MEDICARE

## 2019-03-04 DIAGNOSIS — I48.0 INTERMITTENT ATRIAL FIBRILLATION (HCC): ICD-10-CM

## 2019-03-04 LAB — INTERNATIONAL NORMALIZATION RATIO, POC: 2.2

## 2019-03-04 PROCEDURE — 85610 PROTHROMBIN TIME: CPT | Performed by: PHARMACIST

## 2019-03-04 PROCEDURE — 99211 OFF/OP EST MAY X REQ PHY/QHP: CPT | Performed by: PHARMACIST

## 2019-04-01 ENCOUNTER — ANTI-COAG VISIT (OUTPATIENT)
Dept: PHARMACY | Age: 79
End: 2019-04-01
Payer: MEDICARE

## 2019-04-01 DIAGNOSIS — I48.0 INTERMITTENT ATRIAL FIBRILLATION (HCC): ICD-10-CM

## 2019-04-01 LAB — INTERNATIONAL NORMALIZATION RATIO, POC: 1.9

## 2019-04-01 PROCEDURE — 99211 OFF/OP EST MAY X REQ PHY/QHP: CPT | Performed by: PHARMACIST

## 2019-04-01 PROCEDURE — 85610 PROTHROMBIN TIME: CPT | Performed by: PHARMACIST

## 2019-04-01 NOTE — PROGRESS NOTES
Ms. BO is here for management of anticoagulation for AFib. PMH also significant for HTN and HLD. She presents today w/out complaint. Pt verifies dosing regimen as listed above. Pt denies s/s bleeding/bruising/swelling/SOB. No BRBPR. No melena. Denies missed doses. Reviewed home medications. No changes in RX/OTCs/Herbal medications. Reviewed dietary concerns   Denies EToH and tobacco use. She has had more greens than usual recently. INR 1.9 is within acceptable therapeutic range of 2-3  Recommend to continue dose of 2.5 mg daily  Patient has 2.5 mg tablets. Will continue to monitor and check INR in 4 weeks. Dosing reminder card given with phone number, appointment date and time.    Return to clinic: 4/29/19 @ [facility] Juan R Villareal PharmD 9:20 AM 4/1/19

## 2019-04-03 ENCOUNTER — OFFICE VISIT (OUTPATIENT)
Dept: FAMILY MEDICINE CLINIC | Age: 79
End: 2019-04-03
Payer: MEDICARE

## 2019-04-03 VITALS
BODY MASS INDEX: 26.78 KG/M2 | DIASTOLIC BLOOD PRESSURE: 82 MMHG | SYSTOLIC BLOOD PRESSURE: 124 MMHG | TEMPERATURE: 97.6 F | WEIGHT: 171 LBS | OXYGEN SATURATION: 95 % | HEART RATE: 85 BPM

## 2019-04-03 DIAGNOSIS — I48.0 INTERMITTENT ATRIAL FIBRILLATION (HCC): ICD-10-CM

## 2019-04-03 DIAGNOSIS — F02.80 LATE ONSET ALZHEIMER'S DISEASE WITHOUT BEHAVIORAL DISTURBANCE (HCC): ICD-10-CM

## 2019-04-03 DIAGNOSIS — K21.9 GASTROESOPHAGEAL REFLUX DISEASE WITHOUT ESOPHAGITIS: ICD-10-CM

## 2019-04-03 DIAGNOSIS — E78.00 PURE HYPERCHOLESTEROLEMIA: ICD-10-CM

## 2019-04-03 DIAGNOSIS — F41.9 ANXIETY: ICD-10-CM

## 2019-04-03 DIAGNOSIS — G30.1 LATE ONSET ALZHEIMER'S DISEASE WITHOUT BEHAVIORAL DISTURBANCE (HCC): ICD-10-CM

## 2019-04-03 DIAGNOSIS — I10 BENIGN ESSENTIAL HYPERTENSION: Primary | ICD-10-CM

## 2019-04-03 PROCEDURE — 36415 COLL VENOUS BLD VENIPUNCTURE: CPT | Performed by: FAMILY MEDICINE

## 2019-04-03 PROCEDURE — 99214 OFFICE O/P EST MOD 30 MIN: CPT | Performed by: FAMILY MEDICINE

## 2019-04-03 RX ORDER — PRAVASTATIN SODIUM 20 MG
TABLET ORAL
Qty: 90 TABLET | Refills: 1 | Status: SHIPPED | OUTPATIENT
Start: 2019-04-03 | End: 2019-08-07 | Stop reason: SDUPTHER

## 2019-04-03 RX ORDER — DIGOXIN 125 MCG
125 TABLET ORAL DAILY
Qty: 90 TABLET | Refills: 1 | Status: SHIPPED | OUTPATIENT
Start: 2019-04-03 | End: 2019-08-07 | Stop reason: SDUPTHER

## 2019-04-03 RX ORDER — LORAZEPAM 0.5 MG/1
TABLET ORAL
Qty: 180 TABLET | Refills: 0 | Status: SHIPPED | OUTPATIENT
Start: 2019-04-03 | End: 2019-07-21 | Stop reason: SDUPTHER

## 2019-04-03 RX ORDER — AMLODIPINE BESYLATE 2.5 MG/1
TABLET ORAL
Qty: 180 TABLET | Refills: 1 | Status: SHIPPED | OUTPATIENT
Start: 2019-04-03 | End: 2019-08-07 | Stop reason: SDUPTHER

## 2019-04-03 RX ORDER — OMEPRAZOLE 20 MG/1
20 CAPSULE, DELAYED RELEASE ORAL DAILY
Qty: 90 CAPSULE | Refills: 1 | Status: SHIPPED | OUTPATIENT
Start: 2019-04-03 | End: 2019-08-07 | Stop reason: SDUPTHER

## 2019-04-03 RX ORDER — WARFARIN SODIUM 2.5 MG/1
TABLET ORAL
Qty: 90 TABLET | Refills: 1 | Status: SHIPPED | OUTPATIENT
Start: 2019-04-03 | End: 2019-08-07 | Stop reason: SDUPTHER

## 2019-04-03 ASSESSMENT — PATIENT HEALTH QUESTIONNAIRE - PHQ9
SUM OF ALL RESPONSES TO PHQ QUESTIONS 1-9: 0
1. LITTLE INTEREST OR PLEASURE IN DOING THINGS: 0
SUM OF ALL RESPONSES TO PHQ QUESTIONS 1-9: 0
2. FEELING DOWN, DEPRESSED OR HOPELESS: 0
SUM OF ALL RESPONSES TO PHQ9 QUESTIONS 1 & 2: 0

## 2019-04-03 NOTE — PATIENT INSTRUCTIONS
Please read the healthy family handout that you were given and share it with your family. Please compare this printed medication list with your medications at home to be sure they are the same. If you have any medications that are different please contact us immediately at [phone]. Also review your allergies that we have listed, these may also include medications that you have not been able to tolerate, make sure everything listed is correct. If you have any allergies that are different please contact us immediately at [phone].

## 2019-04-03 NOTE — PROGRESS NOTES
Assessment and plan  1. Anxiety  Stable. [facility] report for [place] and [place] for the last 12 months was requested and reviewed today. The results of the report was consistent with the current treatment plan. - LORazepam (ATIVAN) 0.5 MG tablet; TAKE 1 TABLET BY MOUTH TWICE A DAY  Dispense: 180 tablet; Refill: 0    2. Gastroesophageal reflux disease without esophagitis  Stable    3. Benign essential hypertension  Stable  - Comprehensive Metabolic Panel    4. Intermittent atrial fibrillation (HCC)  Asymptomatic, current normal rhythm  - CBC Auto Differential  - Digoxin Level    5. Late onset Alzheimer's disease without behavioral disturbance  Stable per daughter    10. Pure hypercholesterolemia   - status pending result of lab  - Lipid Panel    Healthy Family prevention recommendations given. Continue all current prescription medications as listed below. RTC 4 months or sooner prn. Subjective  Patient returns for reevaluation of her medical problems including hypertension, anxiety, intermittent A. fib, Alzheimer's and hypercholesterolemia. The patient has no complaints report she feels well. Her daughter report she seems to be doing well with no progression of any cognitive deficits behaviors or other issues. Her blood pressure is good. Her anxiety remains under good control with the lorazepam and she is tolerating it well. She denies any palpitations chest pain or shortness of breath. She continues on her statin. Medications: see list below  Allergies   Allergen Reactions    Beta Adrenergic Blockers Other (See Comments)     Bradycardia     Past history:  The patient reports she has not had other tests, been to the ER, or visits with a specialist since her last visit with me.     Review of systems:  Constitutional:  fatigue - no                                                  abnormal weight loss - no  Cardiac:  chest pain or discomfort - no                 lightheadedness - no  Respiratory: wheezing - no                       dyspnea on exertion - no            unusual cough - no  Gastrointestinal:  frequent heartburn- no                             dysphagia - no  Urologic:  Hematuria - no                   Dysuria - no    Objective  /82   Pulse 85   Temp 97.6 °F (36.4 °C) (Oral)   Wt 171 lb (77.6 kg)   SpO2 95%   BMI 26.78 kg/m²   Patient is alert, oriented, and in no acute distress  Psych:  mood and affect are unremarkable               speech and thought processes appear intact               Behavior and appearance unremarkable  Neck:  No masses, trachea midline, phonation normal   Thyroid - unremarkable              Cervical  adenopathy - nothing significant  Chest:  No deformity of thorax               Respirations easy and unlabored              Lungs - clear to auscultation     Breath sounds - equal  Heart:  Regular rhythm with no murmur, rub or gallop. No JVD. Pretibial pitting edema - none. Faiza Chaudhary MD    Current Outpatient Medications   Medication Sig Dispense Refill    LORazepam (ATIVAN) 0.5 MG tablet TAKE 1 TABLET BY MOUTH TWICE A  tablet 0    warfarin (COUMADIN) 2.5 MG tablet Take 1 daily except 1/2 tablet on Sun 90 tablet 1    amLODIPine (NORVASC) 2.5 MG tablet TAKE 1 TABLET BY MOUTH TWICE A  tablet 1    pravastatin (PRAVACHOL) 20 MG tablet TAKE 1 TABLET BY MOUTH NIGHTLY 90 tablet 1    digoxin (LANOXIN) 125 MCG tablet Take 1 tablet by mouth daily 90 tablet 1    omeprazole (PRILOSEC) 20 MG delayed release capsule Take 1 capsule by mouth daily 90 capsule 1    acetaminophen (TYLENOL) 500 MG tablet Take 1-2 tablets by mouth 3 times daily as needed for Pain 1 tablet 0     No current facility-administered medications for this visit. The note was completed using Dragon voice recognition transcription. Every effort was made to ensure accuracy; however, inadvertent  transcription errors may be present despite my best efforts to edit errors.

## 2019-04-04 LAB
A/G RATIO: 1.7 (ref 1.1–2.2)
ALBUMIN SERPL-MCNC: 4 G/DL (ref 3.4–5)
ALP BLD-CCNC: 106 U/L (ref 40–129)
ALT SERPL-CCNC: 14 U/L (ref 10–40)
ANION GAP SERPL CALCULATED.3IONS-SCNC: 12 MMOL/L (ref 3–16)
AST SERPL-CCNC: 15 U/L (ref 15–37)
BASOPHILS ABSOLUTE: 0.1 K/UL (ref 0–0.2)
BASOPHILS RELATIVE PERCENT: 1 %
BILIRUB SERPL-MCNC: 0.3 MG/DL (ref 0–1)
BUN BLDV-MCNC: 15 MG/DL (ref 7–20)
CALCIUM SERPL-MCNC: 8.9 MG/DL (ref 8.3–10.6)
CHLORIDE BLD-SCNC: 102 MMOL/L (ref 99–110)
CHOLESTEROL, TOTAL: 177 MG/DL (ref 0–199)
CO2: 26 MMOL/L (ref 21–32)
CREAT SERPL-MCNC: 0.8 MG/DL (ref 0.6–1.2)
DIGOXIN LEVEL: 0.8 NG/ML (ref 0.8–2)
EOSINOPHILS ABSOLUTE: 0.1 K/UL (ref 0–0.6)
EOSINOPHILS RELATIVE PERCENT: 1.2 %
GFR AFRICAN AMERICAN: >60
GFR NON-AFRICAN AMERICAN: >60
GLOBULIN: 2.3 G/DL
GLUCOSE BLD-MCNC: 95 MG/DL (ref 70–99)
HCT VFR BLD CALC: 40.3 % (ref 36–48)
HDLC SERPL-MCNC: 41 MG/DL (ref 40–60)
HEMOGLOBIN: 13.2 G/DL (ref 12–16)
LDL CHOLESTEROL CALCULATED: 106 MG/DL
LYMPHOCYTES ABSOLUTE: 2.9 K/UL (ref 1–5.1)
LYMPHOCYTES RELATIVE PERCENT: 31.9 %
MCH RBC QN AUTO: 28.5 PG (ref 26–34)
MCHC RBC AUTO-ENTMCNC: 32.6 G/DL (ref 31–36)
MCV RBC AUTO: 87.1 FL (ref 80–100)
MONOCYTES ABSOLUTE: 0.9 K/UL (ref 0–1.3)
MONOCYTES RELATIVE PERCENT: 10.3 %
NEUTROPHILS ABSOLUTE: 5.1 K/UL (ref 1.7–7.7)
NEUTROPHILS RELATIVE PERCENT: 55.6 %
PDW BLD-RTO: 15.3 % (ref 12.4–15.4)
PLATELET # BLD: 268 K/UL (ref 135–450)
PMV BLD AUTO: 10.3 FL (ref 5–10.5)
POTASSIUM SERPL-SCNC: 4.5 MMOL/L (ref 3.5–5.1)
RBC # BLD: 4.63 M/UL (ref 4–5.2)
SODIUM BLD-SCNC: 140 MMOL/L (ref 136–145)
TOTAL PROTEIN: 6.3 G/DL (ref 6.4–8.2)
TRIGL SERPL-MCNC: 151 MG/DL (ref 0–150)
VLDLC SERPL CALC-MCNC: 30 MG/DL
WBC # BLD: 9.2 K/UL (ref 4–11)

## 2019-04-18 DIAGNOSIS — F41.9 ANXIETY: ICD-10-CM

## 2019-04-19 RX ORDER — LORAZEPAM 0.5 MG/1
TABLET ORAL
Qty: 180 TABLET | OUTPATIENT
Start: 2019-04-19

## 2019-04-29 ENCOUNTER — ANTI-COAG VISIT (OUTPATIENT)
Dept: PHARMACY | Age: 79
End: 2019-04-29
Payer: MEDICARE

## 2019-04-29 DIAGNOSIS — I48.0 INTERMITTENT ATRIAL FIBRILLATION (HCC): ICD-10-CM

## 2019-04-29 LAB — INTERNATIONAL NORMALIZATION RATIO, POC: 1.8

## 2019-04-29 PROCEDURE — 85610 PROTHROMBIN TIME: CPT | Performed by: PHARMACIST

## 2019-04-29 PROCEDURE — 99211 OFF/OP EST MAY X REQ PHY/QHP: CPT | Performed by: PHARMACIST

## 2019-04-29 NOTE — PROGRESS NOTES
Ms. Jeanne Marshall is here for management of anticoagulation for AFib. PMH also significant for HTN and HLD. She presents today w/out complaint. Pt verifies dosing regimen as listed above. Pt denies s/s bleeding/bruising/swelling/SOB. No BRBPR. No melena. Denies missed doses. Reviewed home medications. No changes in RX/OTCs/Herbal medications. Reviewed dietary concerns   Denies EToH and tobacco use. She has had more greens than usual recently. She would like to include more greens in diet. Will increase dose today to help adjust to this. INR 1.8 is below acceptable therapeutic range of 2-3  Recommend to increase dose to 2.5 mg daily except 3.75 mg on Mon  Patient has 2.5 mg tablets. Will continue to monitor and check INR in 3 weeks. Dosing reminder card given with phone number, appointment date and time.    Return to clinic: 5/20/19 @ [address] PharmD 9:21 AM 4/29/19

## 2019-05-06 ENCOUNTER — TELEPHONE (OUTPATIENT)
Dept: FAMILY MEDICINE CLINIC | Age: 79
End: 2019-05-06

## 2019-05-20 ENCOUNTER — ANTI-COAG VISIT (OUTPATIENT)
Dept: PHARMACY | Age: 79
End: 2019-05-20
Payer: MEDICARE

## 2019-05-20 LAB
INTERNATIONAL NORMALIZATION RATIO, POC: 2.1
INTERNATIONAL NORMALIZATION RATIO, POC: 2.1

## 2019-05-20 PROCEDURE — 99211 OFF/OP EST MAY X REQ PHY/QHP: CPT

## 2019-05-20 PROCEDURE — 85610 PROTHROMBIN TIME: CPT

## 2019-05-20 NOTE — PROGRESS NOTES
Ms. Giovanni Aase is here for management of anticoagulation for AFib. PMH also significant for HTN and HLD. She presents today w/out complaint. Pt verifies dosing regimen as listed above. Pt denies s/s bleeding/bruising/swelling/SOB. No BRBPR. No melena. Denies missed doses. Reviewed home medications. No changes in RX/OTCs/Herbal medications. Reviewed dietary concerns   Denies EToH and tobacco use. INR 2.1 is within acceptable therapeutic range of 2-3  Recommend to continue with 2.5 mg daily except 3.75 mg on Mon  Patient has 2.5 mg tablets. Will continue to monitor and check INR in 4 weeks. Dosing reminder card given with phone number, appointment date and time.    Return to clinic: 6/17 @1000    Marito Jara PharmD  5/20/2019 at 3:03 PM

## 2019-06-17 ENCOUNTER — ANTI-COAG VISIT (OUTPATIENT)
Dept: PHARMACY | Age: 79
End: 2019-06-17
Payer: MEDICARE

## 2019-06-17 DIAGNOSIS — I48.0 INTERMITTENT ATRIAL FIBRILLATION (HCC): ICD-10-CM

## 2019-06-17 LAB — INTERNATIONAL NORMALIZATION RATIO, POC: 2.7

## 2019-06-17 PROCEDURE — 99211 OFF/OP EST MAY X REQ PHY/QHP: CPT | Performed by: PHARMACIST

## 2019-06-17 PROCEDURE — 85610 PROTHROMBIN TIME: CPT | Performed by: PHARMACIST

## 2019-06-17 NOTE — PROGRESS NOTES
Ms. Ashleigh Henry is here for management of anticoagulation for AFib. PMH also significant for HTN and HLD. She presents today w/out complaint. Pt verifies dosing regimen as listed above. Pt denies s/s bleeding/bruising/swelling/SOB. No BRBPR. No melena. Denies missed doses. Reviewed home medications. No changes in RX/OTCs/Herbal medications. Reviewed dietary concerns   Denies EToH and tobacco use. INR 2.7 is within acceptable therapeutic range of 2-3  Recommend to continue with 2.5 mg daily except 3.75 mg on Mon  Patient has 2.5 mg tablets. Will continue to monitor and check INR in 4 weeks. Dosing reminder card given with phone number, appointment date and time.    Return to clinic: 7/15 @ 9:30 AM    Mari Du, PharmD 10:17 AM 6/17/19

## 2019-07-15 ENCOUNTER — ANTI-COAG VISIT (OUTPATIENT)
Dept: PHARMACY | Age: 79
End: 2019-07-15
Payer: MEDICARE

## 2019-07-15 DIAGNOSIS — I48.0 INTERMITTENT ATRIAL FIBRILLATION (HCC): ICD-10-CM

## 2019-07-15 LAB — INTERNATIONAL NORMALIZATION RATIO, POC: 2.2

## 2019-07-15 PROCEDURE — 99211 OFF/OP EST MAY X REQ PHY/QHP: CPT | Performed by: PHARMACIST

## 2019-07-15 PROCEDURE — 85610 PROTHROMBIN TIME: CPT | Performed by: PHARMACIST

## 2019-07-21 DIAGNOSIS — F41.9 ANXIETY: ICD-10-CM

## 2019-07-23 RX ORDER — LORAZEPAM 0.5 MG/1
TABLET ORAL
Qty: 180 TABLET | Refills: 0 | OUTPATIENT
Start: 2019-07-23 | End: 2019-09-05 | Stop reason: SDUPTHER

## 2019-08-07 ENCOUNTER — OFFICE VISIT (OUTPATIENT)
Dept: FAMILY MEDICINE CLINIC | Age: 79
End: 2019-08-07
Payer: MEDICARE

## 2019-08-07 VITALS
DIASTOLIC BLOOD PRESSURE: 76 MMHG | TEMPERATURE: 97.9 F | OXYGEN SATURATION: 95 % | SYSTOLIC BLOOD PRESSURE: 150 MMHG | BODY MASS INDEX: 27.72 KG/M2 | HEART RATE: 60 BPM | WEIGHT: 177 LBS

## 2019-08-07 DIAGNOSIS — F02.80 LATE ONSET ALZHEIMER'S DISEASE WITHOUT BEHAVIORAL DISTURBANCE (HCC): ICD-10-CM

## 2019-08-07 DIAGNOSIS — I10 BENIGN ESSENTIAL HYPERTENSION: ICD-10-CM

## 2019-08-07 DIAGNOSIS — F41.9 ANXIETY: ICD-10-CM

## 2019-08-07 DIAGNOSIS — G30.1 LATE ONSET ALZHEIMER'S DISEASE WITHOUT BEHAVIORAL DISTURBANCE (HCC): ICD-10-CM

## 2019-08-07 DIAGNOSIS — I48.0 INTERMITTENT ATRIAL FIBRILLATION (HCC): Primary | ICD-10-CM

## 2019-08-07 PROCEDURE — 99214 OFFICE O/P EST MOD 30 MIN: CPT | Performed by: FAMILY MEDICINE

## 2019-08-07 RX ORDER — WARFARIN SODIUM 2.5 MG/1
TABLET ORAL
Qty: 90 TABLET | Refills: 1 | Status: SHIPPED | OUTPATIENT
Start: 2019-08-07 | End: 2020-02-07

## 2019-08-07 RX ORDER — PRAVASTATIN SODIUM 20 MG
TABLET ORAL
Qty: 90 TABLET | Refills: 1 | Status: SHIPPED | OUTPATIENT
Start: 2019-08-07 | End: 2020-04-03 | Stop reason: SDUPTHER

## 2019-08-07 RX ORDER — DIGOXIN 125 MCG
125 TABLET ORAL DAILY
Qty: 90 TABLET | Refills: 1 | Status: SHIPPED | OUTPATIENT
Start: 2019-08-07 | End: 2020-04-03 | Stop reason: SDUPTHER

## 2019-08-07 RX ORDER — OMEPRAZOLE 20 MG/1
20 CAPSULE, DELAYED RELEASE ORAL DAILY
Qty: 90 CAPSULE | Refills: 1 | Status: SHIPPED | OUTPATIENT
Start: 2019-08-07 | End: 2019-09-03

## 2019-08-07 RX ORDER — AMLODIPINE BESYLATE 5 MG/1
5 TABLET ORAL 2 TIMES DAILY
Qty: 60 TABLET | Refills: 5 | Status: SHIPPED | OUTPATIENT
Start: 2019-08-07 | End: 2019-10-22

## 2019-08-12 ENCOUNTER — ANTI-COAG VISIT (OUTPATIENT)
Dept: PHARMACY | Age: 79
End: 2019-08-12
Payer: MEDICARE

## 2019-08-12 DIAGNOSIS — I48.0 INTERMITTENT ATRIAL FIBRILLATION (HCC): ICD-10-CM

## 2019-08-12 LAB — INTERNATIONAL NORMALIZATION RATIO, POC: 2.4

## 2019-08-12 PROCEDURE — 99211 OFF/OP EST MAY X REQ PHY/QHP: CPT | Performed by: PHARMACIST

## 2019-08-12 PROCEDURE — 85610 PROTHROMBIN TIME: CPT | Performed by: PHARMACIST

## 2019-08-20 ENCOUNTER — TELEPHONE (OUTPATIENT)
Dept: FAMILY MEDICINE CLINIC | Age: 79
End: 2019-08-20

## 2019-08-20 VITALS — DIASTOLIC BLOOD PRESSURE: 70 MMHG | SYSTOLIC BLOOD PRESSURE: 133 MMHG

## 2019-08-20 NOTE — TELEPHONE ENCOUNTER
----- Message from Nadja Braga MD sent at 8/7/2019  4:40 PM EDT -----  Regarding: home blood pressure  Please call the patient and find out how their blood pressure's are doing. If doing OK, record last blood pressure in the telephone encounter.   If blood pressure's still above 139/89, let me know    thanks

## 2019-09-03 ENCOUNTER — TELEPHONE (OUTPATIENT)
Dept: FAMILY MEDICINE CLINIC | Age: 79
End: 2019-09-03

## 2019-09-03 RX ORDER — PANTOPRAZOLE SODIUM 40 MG/1
40 TABLET, DELAYED RELEASE ORAL
Qty: 90 TABLET | Refills: 1 | Status: SHIPPED | OUTPATIENT
Start: 2019-09-03 | End: 2020-03-02

## 2019-09-05 DIAGNOSIS — F41.9 ANXIETY: ICD-10-CM

## 2019-09-06 RX ORDER — LORAZEPAM 0.5 MG/1
TABLET ORAL
Qty: 180 TABLET | Refills: 0 | OUTPATIENT
Start: 2019-09-06 | End: 2019-12-05 | Stop reason: SDUPTHER

## 2019-09-06 NOTE — TELEPHONE ENCOUNTER
Pts daughter called and stated [facility] will not fill pts Lorazepam because it is not due yet. They said it is not due until 10/23/19 which according to our records is incorrect. Pts daughter did a pill count and thinks she was only given 90 pills instead of 180. I called [facility] and they said it is highly unlikely that happened. Daughter keeps pills at her house put away so no one can get to them. Please advise if you want to refill early.  Thank you

## 2019-09-09 ENCOUNTER — ANTI-COAG VISIT (OUTPATIENT)
Dept: PHARMACY | Age: 79
End: 2019-09-09
Payer: MEDICARE

## 2019-09-09 DIAGNOSIS — I48.0 INTERMITTENT ATRIAL FIBRILLATION (HCC): ICD-10-CM

## 2019-09-09 LAB — INTERNATIONAL NORMALIZATION RATIO, POC: 2.7

## 2019-09-09 PROCEDURE — 99211 OFF/OP EST MAY X REQ PHY/QHP: CPT | Performed by: PHARMACIST

## 2019-09-09 PROCEDURE — 85610 PROTHROMBIN TIME: CPT | Performed by: PHARMACIST

## 2019-09-27 ENCOUNTER — NURSE ONLY (OUTPATIENT)
Dept: FAMILY MEDICINE CLINIC | Age: 79
End: 2019-09-27
Payer: MEDICARE

## 2019-09-27 DIAGNOSIS — Z23 NEED FOR INFLUENZA VACCINATION: Primary | ICD-10-CM

## 2019-09-27 PROCEDURE — G0008 ADMIN INFLUENZA VIRUS VAC: HCPCS | Performed by: FAMILY MEDICINE

## 2019-09-27 PROCEDURE — 90653 IIV ADJUVANT VACCINE IM: CPT | Performed by: FAMILY MEDICINE

## 2019-09-27 NOTE — PROGRESS NOTES
Vaccine Information Sheet, \"Influenza - Inactivated\"  given to Marbella Abdul, or parent/legal guardian of  Marbella Abdul and verbalized understanding. Patient responses:    Have you ever had a reaction to a flu vaccine? No  Do you have any current illness? No  Have you ever had Guillian [place] Syndrome? No  Do you have a serious allergy to any of the follow: Neomycin, Polymyxin, Thimerosal, eggs or egg products? No    Flu vaccine given per order. Please see immunization tab. Risks and benefits explained. Current VIS given.

## 2019-10-07 ENCOUNTER — ANTI-COAG VISIT (OUTPATIENT)
Dept: PHARMACY | Age: 79
End: 2019-10-07
Payer: MEDICARE

## 2019-10-07 DIAGNOSIS — I48.0 INTERMITTENT ATRIAL FIBRILLATION (HCC): ICD-10-CM

## 2019-10-07 LAB — INTERNATIONAL NORMALIZATION RATIO, POC: 2.5

## 2019-10-07 PROCEDURE — 99211 OFF/OP EST MAY X REQ PHY/QHP: CPT | Performed by: PHARMACIST

## 2019-10-07 PROCEDURE — 85610 PROTHROMBIN TIME: CPT | Performed by: PHARMACIST

## 2019-10-18 RX ORDER — AMLODIPINE BESYLATE 2.5 MG/1
TABLET ORAL
Qty: 180 TABLET | Refills: 1 | OUTPATIENT
Start: 2019-10-18

## 2019-10-22 RX ORDER — AMLODIPINE BESYLATE 5 MG/1
TABLET ORAL
Qty: 60 TABLET | Refills: 5
Start: 2019-10-22 | End: 2020-03-19

## 2019-11-04 ENCOUNTER — ANTI-COAG VISIT (OUTPATIENT)
Dept: PHARMACY | Age: 79
End: 2019-11-04
Payer: MEDICARE

## 2019-11-04 DIAGNOSIS — I48.0 INTERMITTENT ATRIAL FIBRILLATION (HCC): ICD-10-CM

## 2019-11-04 LAB — INTERNATIONAL NORMALIZATION RATIO, POC: 2.1

## 2019-11-04 PROCEDURE — 85610 PROTHROMBIN TIME: CPT | Performed by: PHARMACIST

## 2019-11-04 PROCEDURE — 99211 OFF/OP EST MAY X REQ PHY/QHP: CPT | Performed by: PHARMACIST

## 2019-12-02 ENCOUNTER — ANTI-COAG VISIT (OUTPATIENT)
Dept: PHARMACY | Age: 79
End: 2019-12-02
Payer: MEDICARE

## 2019-12-02 DIAGNOSIS — I48.0 INTERMITTENT ATRIAL FIBRILLATION (HCC): ICD-10-CM

## 2019-12-02 LAB — INTERNATIONAL NORMALIZATION RATIO, POC: 2.3

## 2019-12-02 PROCEDURE — 85610 PROTHROMBIN TIME: CPT | Performed by: PHARMACIST

## 2019-12-02 PROCEDURE — 99211 OFF/OP EST MAY X REQ PHY/QHP: CPT | Performed by: PHARMACIST

## 2019-12-05 DIAGNOSIS — F41.9 ANXIETY: ICD-10-CM

## 2019-12-05 RX ORDER — LORAZEPAM 0.5 MG/1
0.5 TABLET ORAL 2 TIMES DAILY
Qty: 180 TABLET | Refills: 0 | Status: SHIPPED | OUTPATIENT
Start: 2019-12-05 | End: 2020-03-03 | Stop reason: SDUPTHER

## 2019-12-11 ENCOUNTER — OFFICE VISIT (OUTPATIENT)
Dept: FAMILY MEDICINE CLINIC | Age: 79
End: 2019-12-11
Payer: MEDICARE

## 2019-12-11 VITALS
OXYGEN SATURATION: 94 % | HEIGHT: 67 IN | WEIGHT: 178 LBS | HEART RATE: 70 BPM | TEMPERATURE: 98 F | BODY MASS INDEX: 27.94 KG/M2 | DIASTOLIC BLOOD PRESSURE: 76 MMHG | SYSTOLIC BLOOD PRESSURE: 133 MMHG

## 2019-12-11 DIAGNOSIS — F02.80 LATE ONSET ALZHEIMER'S DISEASE WITHOUT BEHAVIORAL DISTURBANCE (HCC): ICD-10-CM

## 2019-12-11 DIAGNOSIS — G30.1 LATE ONSET ALZHEIMER'S DISEASE WITHOUT BEHAVIORAL DISTURBANCE (HCC): ICD-10-CM

## 2019-12-11 DIAGNOSIS — I10 BENIGN ESSENTIAL HYPERTENSION: ICD-10-CM

## 2019-12-11 DIAGNOSIS — F41.9 ANXIETY: ICD-10-CM

## 2019-12-11 DIAGNOSIS — I48.0 INTERMITTENT ATRIAL FIBRILLATION (HCC): Primary | ICD-10-CM

## 2019-12-11 LAB
A/G RATIO: 1.7 (ref 1.1–2.2)
ALBUMIN SERPL-MCNC: 4.1 G/DL (ref 3.4–5)
ALP BLD-CCNC: 102 U/L (ref 40–129)
ALT SERPL-CCNC: 16 U/L (ref 10–40)
ANION GAP SERPL CALCULATED.3IONS-SCNC: 14 MMOL/L (ref 3–16)
AST SERPL-CCNC: 16 U/L (ref 15–37)
BASOPHILS ABSOLUTE: 0 K/UL (ref 0–0.2)
BASOPHILS RELATIVE PERCENT: 0.3 %
BILIRUB SERPL-MCNC: 0.5 MG/DL (ref 0–1)
BUN BLDV-MCNC: 15 MG/DL (ref 7–20)
CALCIUM SERPL-MCNC: 9.3 MG/DL (ref 8.3–10.6)
CHLORIDE BLD-SCNC: 101 MMOL/L (ref 99–110)
CO2: 25 MMOL/L (ref 21–32)
CREAT SERPL-MCNC: 0.9 MG/DL (ref 0.6–1.2)
DIGOXIN LEVEL: 0.9 NG/ML (ref 0.8–2)
EOSINOPHILS ABSOLUTE: 0.1 K/UL (ref 0–0.6)
EOSINOPHILS RELATIVE PERCENT: 1.5 %
GFR AFRICAN AMERICAN: >60
GFR NON-AFRICAN AMERICAN: >60
GLOBULIN: 2.4 G/DL
GLUCOSE BLD-MCNC: 103 MG/DL (ref 70–99)
HCT VFR BLD CALC: 40.8 % (ref 36–48)
HEMOGLOBIN: 13.5 G/DL (ref 12–16)
LYMPHOCYTES ABSOLUTE: 2.6 K/UL (ref 1–5.1)
LYMPHOCYTES RELATIVE PERCENT: 26.5 %
MCH RBC QN AUTO: 29.4 PG (ref 26–34)
MCHC RBC AUTO-ENTMCNC: 33.1 G/DL (ref 31–36)
MCV RBC AUTO: 88.8 FL (ref 80–100)
MONOCYTES ABSOLUTE: 1.1 K/UL (ref 0–1.3)
MONOCYTES RELATIVE PERCENT: 11.4 %
NEUTROPHILS ABSOLUTE: 5.8 K/UL (ref 1.7–7.7)
NEUTROPHILS RELATIVE PERCENT: 60.3 %
PDW BLD-RTO: 14.3 % (ref 12.4–15.4)
PLATELET # BLD: 268 K/UL (ref 135–450)
PMV BLD AUTO: 10.6 FL (ref 5–10.5)
POTASSIUM SERPL-SCNC: 4.1 MMOL/L (ref 3.5–5.1)
RBC # BLD: 4.6 M/UL (ref 4–5.2)
SODIUM BLD-SCNC: 140 MMOL/L (ref 136–145)
TOTAL PROTEIN: 6.5 G/DL (ref 6.4–8.2)
WBC # BLD: 9.7 K/UL (ref 4–11)

## 2019-12-11 PROCEDURE — 36415 COLL VENOUS BLD VENIPUNCTURE: CPT | Performed by: FAMILY MEDICINE

## 2019-12-11 PROCEDURE — 99214 OFFICE O/P EST MOD 30 MIN: CPT | Performed by: FAMILY MEDICINE

## 2019-12-30 ENCOUNTER — ANTI-COAG VISIT (OUTPATIENT)
Dept: PHARMACY | Age: 79
End: 2019-12-30
Payer: MEDICARE

## 2019-12-30 DIAGNOSIS — I48.0 INTERMITTENT ATRIAL FIBRILLATION (HCC): ICD-10-CM

## 2019-12-30 LAB — INTERNATIONAL NORMALIZATION RATIO, POC: 2.3

## 2019-12-30 PROCEDURE — 99211 OFF/OP EST MAY X REQ PHY/QHP: CPT | Performed by: PHARMACIST

## 2019-12-30 PROCEDURE — 85610 PROTHROMBIN TIME: CPT | Performed by: PHARMACIST

## 2020-01-01 ENCOUNTER — ANTI-COAG VISIT (OUTPATIENT)
Dept: PHARMACY | Age: 80
End: 2020-01-01
Payer: MEDICARE

## 2020-01-01 ENCOUNTER — OFFICE VISIT (OUTPATIENT)
Dept: FAMILY MEDICINE CLINIC | Age: 80
End: 2020-01-01
Payer: MEDICARE

## 2020-01-01 VITALS
SYSTOLIC BLOOD PRESSURE: 135 MMHG | WEIGHT: 177.6 LBS | TEMPERATURE: 98.1 F | BODY MASS INDEX: 27.82 KG/M2 | DIASTOLIC BLOOD PRESSURE: 71 MMHG | OXYGEN SATURATION: 93 % | HEART RATE: 67 BPM

## 2020-01-01 VITALS
WEIGHT: 179.4 LBS | BODY MASS INDEX: 28.1 KG/M2 | SYSTOLIC BLOOD PRESSURE: 121 MMHG | OXYGEN SATURATION: 96 % | DIASTOLIC BLOOD PRESSURE: 74 MMHG | HEART RATE: 65 BPM | TEMPERATURE: 98 F

## 2020-01-01 LAB
A/G RATIO: 1.7 (ref 1.1–2.2)
ALBUMIN SERPL-MCNC: 3.9 G/DL (ref 3.4–5)
ALP BLD-CCNC: 124 U/L (ref 40–129)
ALT SERPL-CCNC: 16 U/L (ref 10–40)
ANION GAP SERPL CALCULATED.3IONS-SCNC: 12 MMOL/L (ref 3–16)
ANION GAP SERPL CALCULATED.3IONS-SCNC: 14 MMOL/L (ref 3–16)
AST SERPL-CCNC: 18 U/L (ref 15–37)
BASOPHILS ABSOLUTE: 0.1 K/UL (ref 0–0.2)
BASOPHILS RELATIVE PERCENT: 1 %
BILIRUB SERPL-MCNC: 0.3 MG/DL (ref 0–1)
BUN BLDV-MCNC: 12 MG/DL (ref 7–20)
BUN BLDV-MCNC: 16 MG/DL (ref 7–20)
CALCIUM SERPL-MCNC: 8.8 MG/DL (ref 8.3–10.6)
CALCIUM SERPL-MCNC: 8.9 MG/DL (ref 8.3–10.6)
CHLORIDE BLD-SCNC: 101 MMOL/L (ref 99–110)
CHLORIDE BLD-SCNC: 104 MMOL/L (ref 99–110)
CHOLESTEROL, TOTAL: 176 MG/DL (ref 0–199)
CO2: 23 MMOL/L (ref 21–32)
CO2: 26 MMOL/L (ref 21–32)
CREAT SERPL-MCNC: 0.8 MG/DL (ref 0.6–1.2)
CREAT SERPL-MCNC: 0.8 MG/DL (ref 0.6–1.2)
DIGOXIN LEVEL: 1.4 NG/ML (ref 0.8–2)
EOSINOPHILS ABSOLUTE: 0.1 K/UL (ref 0–0.6)
EOSINOPHILS RELATIVE PERCENT: 1.7 %
GFR AFRICAN AMERICAN: >60
GFR AFRICAN AMERICAN: >60
GFR NON-AFRICAN AMERICAN: >60
GFR NON-AFRICAN AMERICAN: >60
GLOBULIN: 2.3 G/DL
GLUCOSE BLD-MCNC: 90 MG/DL (ref 70–99)
GLUCOSE BLD-MCNC: 97 MG/DL (ref 70–99)
HCT VFR BLD CALC: 42 % (ref 36–48)
HDLC SERPL-MCNC: 44 MG/DL (ref 40–60)
HEMOGLOBIN: 13.7 G/DL (ref 12–16)
INTERNATIONAL NORMALIZATION RATIO, POC: 2.2
INTERNATIONAL NORMALIZATION RATIO, POC: 2.4
INTERNATIONAL NORMALIZATION RATIO, POC: 2.4
INTERNATIONAL NORMALIZATION RATIO, POC: 2.7
INTERNATIONAL NORMALIZATION RATIO, POC: 2.7
LDL CHOLESTEROL CALCULATED: 105 MG/DL
LYMPHOCYTES ABSOLUTE: 2.5 K/UL (ref 1–5.1)
LYMPHOCYTES RELATIVE PERCENT: 29 %
MCH RBC QN AUTO: 28.6 PG (ref 26–34)
MCHC RBC AUTO-ENTMCNC: 32.6 G/DL (ref 31–36)
MCV RBC AUTO: 87.9 FL (ref 80–100)
MONOCYTES ABSOLUTE: 0.7 K/UL (ref 0–1.3)
MONOCYTES RELATIVE PERCENT: 8.4 %
NEUTROPHILS ABSOLUTE: 5.1 K/UL (ref 1.7–7.7)
NEUTROPHILS RELATIVE PERCENT: 59.9 %
PDW BLD-RTO: 14.9 % (ref 12.4–15.4)
PLATELET # BLD: 262 K/UL (ref 135–450)
PMV BLD AUTO: 10.3 FL (ref 5–10.5)
POTASSIUM SERPL-SCNC: 4.4 MMOL/L (ref 3.5–5.1)
POTASSIUM SERPL-SCNC: 4.6 MMOL/L (ref 3.5–5.1)
RBC # BLD: 4.78 M/UL (ref 4–5.2)
SODIUM BLD-SCNC: 138 MMOL/L (ref 136–145)
SODIUM BLD-SCNC: 142 MMOL/L (ref 136–145)
TOTAL PROTEIN: 6.2 G/DL (ref 6.4–8.2)
TRIGL SERPL-MCNC: 136 MG/DL (ref 0–150)
VLDLC SERPL CALC-MCNC: 27 MG/DL
WBC # BLD: 8.4 K/UL (ref 4–11)

## 2020-01-01 PROCEDURE — 99211 OFF/OP EST MAY X REQ PHY/QHP: CPT | Performed by: PHARMACIST

## 2020-01-01 PROCEDURE — 85610 PROTHROMBIN TIME: CPT | Performed by: PHARMACIST

## 2020-01-01 PROCEDURE — 36415 COLL VENOUS BLD VENIPUNCTURE: CPT | Performed by: NURSE PRACTITIONER

## 2020-01-01 PROCEDURE — G0008 ADMIN INFLUENZA VIRUS VAC: HCPCS | Performed by: NURSE PRACTITIONER

## 2020-01-01 PROCEDURE — 99214 OFFICE O/P EST MOD 30 MIN: CPT | Performed by: FAMILY MEDICINE

## 2020-01-01 PROCEDURE — 36415 COLL VENOUS BLD VENIPUNCTURE: CPT | Performed by: FAMILY MEDICINE

## 2020-01-01 PROCEDURE — 99214 OFFICE O/P EST MOD 30 MIN: CPT | Performed by: NURSE PRACTITIONER

## 2020-01-01 PROCEDURE — 90694 VACC AIIV4 NO PRSRV 0.5ML IM: CPT | Performed by: NURSE PRACTITIONER

## 2020-01-01 RX ORDER — PRAVASTATIN SODIUM 20 MG
TABLET ORAL
Qty: 90 TABLET | Refills: 1 | Status: SHIPPED | OUTPATIENT
Start: 2020-01-01 | End: 2021-01-01

## 2020-01-01 RX ORDER — PANTOPRAZOLE SODIUM 40 MG/1
TABLET, DELAYED RELEASE ORAL
Qty: 90 TABLET | Refills: 1 | Status: SHIPPED | OUTPATIENT
Start: 2020-01-01 | End: 2020-01-01 | Stop reason: SDUPTHER

## 2020-01-01 RX ORDER — WARFARIN SODIUM 2.5 MG/1
TABLET ORAL
Qty: 84 TABLET | Refills: 4 | Status: SHIPPED | OUTPATIENT
Start: 2020-01-01 | End: 2021-01-01 | Stop reason: SDUPTHER

## 2020-01-01 RX ORDER — AMLODIPINE BESYLATE 5 MG/1
TABLET ORAL
Qty: 90 TABLET | Refills: 1 | Status: SHIPPED | OUTPATIENT
Start: 2020-01-01 | End: 2020-01-01 | Stop reason: SDUPTHER

## 2020-01-01 RX ORDER — LORAZEPAM 0.5 MG/1
0.5 TABLET ORAL 2 TIMES DAILY
Qty: 180 TABLET | Refills: 0 | Status: SHIPPED | OUTPATIENT
Start: 2020-01-01 | End: 2021-01-01

## 2020-01-01 RX ORDER — AMLODIPINE BESYLATE 2.5 MG/1
TABLET ORAL
Qty: 90 TABLET | Refills: 1 | Status: SHIPPED | OUTPATIENT
Start: 2020-01-01 | End: 2021-01-01

## 2020-01-01 RX ORDER — AMLODIPINE BESYLATE 5 MG/1
TABLET ORAL
Qty: 90 TABLET | Refills: 1 | Status: SHIPPED | OUTPATIENT
Start: 2020-01-01 | End: 2021-01-01 | Stop reason: SDUPTHER

## 2020-01-01 RX ORDER — DIGOXIN 125 MCG
TABLET ORAL
Qty: 90 TABLET | Refills: 1 | Status: SHIPPED | OUTPATIENT
Start: 2020-01-01 | End: 2021-01-01 | Stop reason: SDUPTHER

## 2020-01-01 RX ORDER — LORAZEPAM 0.5 MG/1
0.5 TABLET ORAL 2 TIMES DAILY
Qty: 180 TABLET | Refills: 0 | Status: SHIPPED | OUTPATIENT
Start: 2020-01-01 | End: 2020-01-01 | Stop reason: SDUPTHER

## 2020-01-01 RX ORDER — DIGOXIN 125 MCG
TABLET ORAL
Qty: 90 TABLET | Refills: 1 | Status: SHIPPED | OUTPATIENT
Start: 2020-01-01 | End: 2020-01-01 | Stop reason: SDUPTHER

## 2020-01-01 RX ORDER — PANTOPRAZOLE SODIUM 40 MG/1
TABLET, DELAYED RELEASE ORAL
Qty: 90 TABLET | Refills: 1 | Status: SHIPPED | OUTPATIENT
Start: 2020-01-01 | End: 2021-01-01 | Stop reason: SDUPTHER

## 2020-01-01 RX ORDER — PRAVASTATIN SODIUM 20 MG
TABLET ORAL
Qty: 90 TABLET | Refills: 1 | Status: SHIPPED | OUTPATIENT
Start: 2020-01-01 | End: 2020-01-01 | Stop reason: SDUPTHER

## 2020-01-01 RX ORDER — AMLODIPINE BESYLATE 2.5 MG/1
TABLET ORAL
Qty: 90 TABLET | Refills: 1 | Status: SHIPPED | OUTPATIENT
Start: 2020-01-01 | End: 2020-01-01 | Stop reason: SDUPTHER

## 2020-01-01 ASSESSMENT — PATIENT HEALTH QUESTIONNAIRE - PHQ9
1. LITTLE INTEREST OR PLEASURE IN DOING THINGS: 0
2. FEELING DOWN, DEPRESSED OR HOPELESS: 0
SUM OF ALL RESPONSES TO PHQ9 QUESTIONS 1 & 2: 0
SUM OF ALL RESPONSES TO PHQ QUESTIONS 1-9: 0
SUM OF ALL RESPONSES TO PHQ QUESTIONS 1-9: 0

## 2020-01-01 ASSESSMENT — ENCOUNTER SYMPTOMS
RESPIRATORY NEGATIVE: 1
EYES NEGATIVE: 1
GASTROINTESTINAL NEGATIVE: 1

## 2020-01-27 ENCOUNTER — ANTI-COAG VISIT (OUTPATIENT)
Dept: PHARMACY | Age: 80
End: 2020-01-27
Payer: MEDICARE

## 2020-01-27 LAB — INTERNATIONAL NORMALIZATION RATIO, POC: 2.6

## 2020-01-27 PROCEDURE — 99211 OFF/OP EST MAY X REQ PHY/QHP: CPT | Performed by: PHARMACIST

## 2020-01-27 PROCEDURE — 85610 PROTHROMBIN TIME: CPT | Performed by: PHARMACIST

## 2020-02-07 RX ORDER — WARFARIN SODIUM 2.5 MG/1
TABLET ORAL
Qty: 84 TABLET | Refills: 4 | Status: SHIPPED | OUTPATIENT
Start: 2020-02-07 | End: 2020-01-01 | Stop reason: SDUPTHER

## 2020-02-21 ENCOUNTER — OFFICE VISIT (OUTPATIENT)
Dept: FAMILY MEDICINE CLINIC | Age: 80
End: 2020-02-21
Payer: MEDICARE

## 2020-02-21 VITALS
OXYGEN SATURATION: 94 % | BODY MASS INDEX: 27.1 KG/M2 | WEIGHT: 173 LBS | TEMPERATURE: 98 F | SYSTOLIC BLOOD PRESSURE: 102 MMHG | DIASTOLIC BLOOD PRESSURE: 60 MMHG | HEART RATE: 66 BPM

## 2020-02-21 PROCEDURE — 99213 OFFICE O/P EST LOW 20 MIN: CPT | Performed by: FAMILY MEDICINE

## 2020-02-21 RX ORDER — AMOXICILLIN 500 MG/1
1000 CAPSULE ORAL 2 TIMES DAILY
Qty: 40 CAPSULE | Refills: 0 | Status: SHIPPED | OUTPATIENT
Start: 2020-02-21 | End: 2020-03-02

## 2020-02-21 NOTE — PROGRESS NOTES
Assessment and plan  1. Acute bacterial sinusitis  Amoxicillin 1000 mg bid x 10 days    RTC prn or call if symptoms don't improve or resolve as discussed    Subjective  Patient brought in by daughter with 4 to 5 days of head congestion chest congestion purulent nasal drainage malaise fatigue and poor appetite. She denies shortness of breath chest pain or wheezing. Objective  /60   Pulse 66   Temp 98 °F (36.7 °C) (Oral)   Wt 173 lb (78.5 kg)   SpO2 94%   BMI 27.10 kg/m²   Patient in no acute distress. He does look malaise but not toxic. Sclera and lids are clear. Throat:  pharynx irritated. Neck:  no significant masses               adenopathy - none significant  Lungs:  auscultation: clear                breath sounds are equal, no respiratory distress  Heart:  reg rhythm              no murmur      Lisa Tariq MD    The note was completed using Dragon voice recognition transcription. Every effort was made to ensure accuracy; however, inadvertent  transcription errors may be present despite my best efforts to edit errors.

## 2020-02-21 NOTE — PATIENT INSTRUCTIONS
Please read the healthy family handout that you were given and share it with your family. Please compare this printed medication list with your medications at home to be sure they are the same. If you have any medications that are different please contact us immediately at [phone]. Also review your allergies that we have listed, these may also include medications that you have not been able to tolerate, make sure everything listed is correct. If you have any allergies that are different please contact us immediately at [phone].

## 2020-02-24 ENCOUNTER — ANTI-COAG VISIT (OUTPATIENT)
Dept: PHARMACY | Age: 80
End: 2020-02-24
Payer: MEDICARE

## 2020-02-24 LAB — INTERNATIONAL NORMALIZATION RATIO, POC: 2.1

## 2020-02-24 PROCEDURE — 99211 OFF/OP EST MAY X REQ PHY/QHP: CPT | Performed by: PHARMACIST

## 2020-02-24 PROCEDURE — 85610 PROTHROMBIN TIME: CPT | Performed by: PHARMACIST

## 2020-02-24 NOTE — PROGRESS NOTES
Ms. Jeanette Oswald is here for management of anticoagulation for AFib. PMH also significant for HTN and HLD. She presents today w/out complaint. Pt verifies dosing regimen as listed above. Pt denies s/s bleeding/bruising/swelling/SOB. No BRBPR. No melena. Denies missed doses. Reviewed home medications. No changes in RX/OTCs/Herbal medications. Reviewed dietary concerns   Denies EToH and tobacco use. Currently on course of amoxicillin. INR 2.1 is within acceptable therapeutic range of 2-3  Recommend to continue with 2.5 mg daily except 3.75 mg on Mon  Patient has 2.5 mg tablets. Will continue to monitor and check INR in 4 weeks. Dosing reminder card given with phone number, appointment date and time.    Return to clinic:  3/23/20 @ 9:30 AM    Cammy Hartmann PharmD 9:35 AM 2/24/20

## 2020-03-02 RX ORDER — PANTOPRAZOLE SODIUM 40 MG/1
TABLET, DELAYED RELEASE ORAL
Qty: 90 TABLET | Refills: 1 | Status: SHIPPED | OUTPATIENT
Start: 2020-03-02 | End: 2020-01-01

## 2020-03-03 RX ORDER — LORAZEPAM 0.5 MG/1
0.5 TABLET ORAL 2 TIMES DAILY
Qty: 180 TABLET | Refills: 0 | Status: SHIPPED | OUTPATIENT
Start: 2020-03-03 | End: 2020-05-27 | Stop reason: SDUPTHER

## 2020-03-03 NOTE — TELEPHONE ENCOUNTER
Controlled Substance Monitoring:    Acute and Chronic Pain Monitoring:   RX Monitoring 3/3/2020   Attestation -   Periodic Controlled Substance Monitoring No signs of potential drug abuse or diversion identified.

## 2020-03-19 RX ORDER — AMLODIPINE BESYLATE 5 MG/1
TABLET ORAL
Qty: 180 TABLET | Refills: 1 | Status: SHIPPED | OUTPATIENT
Start: 2020-03-19 | End: 2020-06-01

## 2020-03-23 ENCOUNTER — ANTI-COAG VISIT (OUTPATIENT)
Dept: PHARMACY | Age: 80
End: 2020-03-23
Payer: MEDICARE

## 2020-03-23 LAB — INTERNATIONAL NORMALIZATION RATIO, POC: 2.3

## 2020-03-23 PROCEDURE — 99211 OFF/OP EST MAY X REQ PHY/QHP: CPT

## 2020-03-23 PROCEDURE — 85610 PROTHROMBIN TIME: CPT

## 2020-04-03 ENCOUNTER — VIRTUAL VISIT (OUTPATIENT)
Dept: FAMILY MEDICINE CLINIC | Age: 80
End: 2020-04-03
Payer: MEDICARE

## 2020-04-03 PROCEDURE — 99214 OFFICE O/P EST MOD 30 MIN: CPT | Performed by: FAMILY MEDICINE

## 2020-04-03 RX ORDER — PRAVASTATIN SODIUM 20 MG
TABLET ORAL
Qty: 90 TABLET | Refills: 1 | Status: SHIPPED | OUTPATIENT
Start: 2020-04-03 | End: 2020-01-01

## 2020-04-03 RX ORDER — DIGOXIN 125 MCG
125 TABLET ORAL DAILY
Qty: 90 TABLET | Refills: 1 | Status: SHIPPED | OUTPATIENT
Start: 2020-04-03 | End: 2020-01-01

## 2020-05-18 ENCOUNTER — ANTI-COAG VISIT (OUTPATIENT)
Dept: PHARMACY | Age: 80
End: 2020-05-18
Payer: MEDICARE

## 2020-05-18 LAB — INTERNATIONAL NORMALIZATION RATIO, POC: 2

## 2020-05-18 PROCEDURE — 85610 PROTHROMBIN TIME: CPT | Performed by: PHARMACIST

## 2020-05-18 PROCEDURE — 99211 OFF/OP EST MAY X REQ PHY/QHP: CPT | Performed by: PHARMACIST

## 2020-05-27 RX ORDER — LORAZEPAM 0.5 MG/1
0.5 TABLET ORAL 2 TIMES DAILY
Qty: 180 TABLET | Refills: 0 | Status: SHIPPED | OUTPATIENT
Start: 2020-05-27 | End: 2020-01-01

## 2020-05-27 NOTE — TELEPHONE ENCOUNTER
Date of last refill of this med was 3-3-20, # of pills given 180 and # of refills given 0. Their next appointment is 6-17-20, the last date patient was seen was 4-3-20. Does patient have medication agreement on file? Yes  Has drug screen been done in last 12 months if needed? No    Spoke to daughter and patient takes Amolodipine 2.5mg in the pm and needs a prescription for that.  I did not see where a change has been made

## 2020-06-01 RX ORDER — AMLODIPINE BESYLATE 2.5 MG/1
2.5 TABLET ORAL DAILY
Qty: 90 TABLET | Refills: 1 | Status: SHIPPED | OUTPATIENT
Start: 2020-06-01 | End: 2020-01-01

## 2020-06-01 RX ORDER — AMLODIPINE BESYLATE 5 MG/1
TABLET ORAL
Qty: 180 TABLET | Refills: 1
Start: 2020-06-01 | End: 2020-01-01 | Stop reason: SDUPTHER

## 2020-06-17 NOTE — PROGRESS NOTES
Assessment and plan  1. Benign essential hypertension  Stable  - CBC Auto Differential  - Basic Metabolic Panel    2. Late onset Alzheimer's disease without behavioral disturbance (HCC)  Stable    3. Anxiety  Stable. [facility] report for [place] for the last 12 months was requested and reviewed today. The results of the report was consistent with the current treatment plan. 4. Intermittent atrial fibrillation (HCC)  Asymptomatic  - Digoxin Level    5. Pure hypercholesterolemia   - status pending result of lab  - Lipid Panel    Healthy Family prevention recommendations given. Continue all current prescription medications as listed below. RTC in 4 months with ROBERT Fong, or sooner prn. Subjective  Patient returns for reevaluation of her medical problems including hypertension, Alzheimer's, anxiety, intermittent atrial fibrillation and hypercholesterolemia. Her daughter reports that overall things are going well. Her blood pressure remains good at home. There is been no behavioral disturbance. She still takes lorazepam and tolerates it well for her anxiety. There is been no complaints of palpitations chest pain or shortness of breath. She is tolerating her statin. Medications and allergies: see list below  Past history: See below  The patient reports she has not had other tests, been to the ER, or visits with a specialist since her last visit with me.     Review of systems:  Constitutional:  fatigue - no                                                  abnormal weight loss - no  Cardiac:  chest pain or discomfort - no                 lightheadedness - no  Respiratory: wheezing - no                       dyspnea on exertion - no            unusual cough - no  Gastrointestinal:  frequent heartburn- no                             dysphagia - no  Urologic:  Hematuria - no                   Dysuria - no    Objective  /71   Pulse 67   Temp 98.1 °F (36.7 °C) (Oral)   Wt 177 lb 9.6 oz (80.6 kg)   SpO2 93%   BMI 27.82 kg/m²   Patient is alert and in no acute distress  Psych:  mood and affect are unremarkable               speech and thought processes appear intact               Behavior and appearance unremarkable  Neck:  No masses, trachea midline, phonation normal   Thyroid - unremarkable              Cervical  adenopathy - none  Chest:  No deformity of thorax               Respirations easy and unlabored              Lungs - clear to auscultation     Breath sounds - equal  Heart: Rhythm is slightly irregular. No murmur JVD or pretibial pitting edema. Godfrey Manuel MD    Current Outpatient Medications   Medication Sig Dispense Refill    amLODIPine (NORVASC) 2.5 MG tablet Take 1 tablet by mouth daily 90 tablet 1    amLODIPine (NORVASC) 5 MG tablet TAKE 1 TABLET BY MOUTH DAILY 180 tablet 1    LORazepam (ATIVAN) 0.5 MG tablet Take 1 tablet by mouth 2 times daily for 90 days. 180 tablet 0    digoxin (LANOXIN) 125 MCG tablet Take 1 tablet by mouth daily 90 tablet 1    pravastatin (PRAVACHOL) 20 MG tablet TAKE 1 TABLET BY MOUTH NIGHTLY 90 tablet 1    pantoprazole (PROTONIX) 40 MG tablet TAKE 1 TABLET BY MOUTH EVERY DAY BEFORE BREAKFAST 90 tablet 1    warfarin (COUMADIN) 2.5 MG tablet TAKE 1 TAB DAILY EXCEPT 1/2 TAB ON SUNDAY 84 tablet 4    acetaminophen (TYLENOL) 500 MG tablet Take 1-2 tablets by mouth 3 times daily as needed for Pain 1 tablet 0     No current facility-administered medications for this visit.         Patient Active Problem List    Diagnosis Date Noted    Late onset Alzheimer's disease without behavioral disturbance ([facility] 08/31/2018     Priority: High    Benign essential hypertension 02/13/2015     Priority: High    Primary osteoarthritis of knees, bilateral 08/25/2017     Priority: Medium    Intermittent [facility] fibrillation ([facility] 07/24/2017     Priority: Medium     Admitted [facility] 7/21/17, coumadin clinic, last dig level: 12/11/2019       Chronic midline low back pain

## 2020-06-29 NOTE — PROGRESS NOTES
Ms. Jaleesa Arteaga is here for management of anticoagulation for AFib. PMH also significant for HTN and HLD. She presents today w/out complaint. Pt verifies dosing regimen as listed above. Pt denies s/s bleeding/bruising/swelling/SOB. No BRBPR. No melena. Denies missed doses. Reviewed home medications. No changes in RX/OTCs/Herbal medications. Reviewed dietary concerns   Denies EToH and tobacco use. No changes per pt    Pt seen in office  97.7 deg F    INR 2.4 is within acceptable therapeutic range of 2-3  Recommend to continue with 2.5 mg daily except 3.75 mg on Mon  Patient has 2.5 mg tablets. Will continue to monitor and check INR in 6 weeks. Dosing reminder card given with phone number, appointment date and time.    Return to clinic:  8/10/20 @ 9:30 AM    Musa GonzalezD 9:50 AM EDT 6/29/20

## 2020-08-10 NOTE — PROGRESS NOTES
Ms. Gold Mckeon is here for management of anticoagulation for AFib. PMH also significant for HTN and HLD. She presents today w/out complaint. Pt verifies dosing regimen as listed above. Pt denies s/s bleeding/bruising/swelling/SOB. No BRBPR. No melena. Denies missed doses. Reviewed home medications. No changes in RX/OTCs/Herbal medications. Reviewed dietary concerns   Denies EToH and tobacco use. No changes per pt    Pt seen in office  97.3 deg F    INR 2.7 is within acceptable therapeutic range of 2-3  Recommend to continue with 2.5 mg daily except 3.75 mg on Mon  Patient has 2.5 mg tablets. Will continue to monitor and check INR in 6 weeks. Dosing reminder card given with phone number, appointment date and time.    Return to clinic:  9/21/20 @ 9:30 AM    Lacey Vogel, PharmD 9:49 AM EDT 8/10/20

## 2020-08-28 NOTE — TELEPHONE ENCOUNTER
Date of last refill of this med was 05/27/2020, # of pills given 180 and # of refills given 0. Their next appointment is 10/15/2020, the last date patient was seen was 06/17/2020. Does patient have medication agreement on file? Yes  Has drug screen been done in last 12 months if needed?  no

## 2020-08-28 NOTE — TELEPHONE ENCOUNTER
Controlled Substance Monitoring:    Acute and Chronic Pain Monitoring:   RX Monitoring 8/28/2020   Attestation -   Periodic Controlled Substance Monitoring No signs of potential drug abuse or diversion identified.

## 2020-10-15 NOTE — PATIENT INSTRUCTIONS
Please read the healthy family handout that you were given and share it with your family. Please compare this printed medication list with your medications at home to be sure they are the same. If you have any medications that are different please contact us immediately at [phone]. Also review your allergies that we have listed, these may also include medications that you have not been able to tolerate, make sure everything listed is correct. If you have any allergies that are different please contact us immediately at [phone]. Patient Education        influenza virus vaccine (injection)  Pronunciation:  in floo ENZ a NANCYYE steph VAK seen  Brand:  Afluria, Agriflu, Fluad [id number], Fluarix, Flublok Quadrivalent [id number], Flucelvax, FluLaval, Fluogen, Flushield, Fluvirin, Fluzone  What is the most important information I should know about this vaccine? The injectable influenza virus vaccine (flu shot) is a \"killed virus\" vaccine. Influenza virus vaccine is also available in a nasal spray form, which is a \"live virus\" vaccine. This medication guide addresses only the injectable form of this vaccine. Becoming infected with influenza is much more dangerous to your health than receiving this vaccine. However, like any medicine, this vaccine can cause side effects but the risk of serious side effects is extremely low. What is influenza virus vaccine? Influenza virus (commonly known as \"the flu\") is a serious disease caused by a virus. Influenza virus can spread from one person to another through small droplets of saliva that are expelled into the air when an infected person coughs or sneezes. The virus can also be passed through contact with objects the infected person has touched, such as a door handle or other surfaces. Influenza virus vaccine is used to prevent infection caused by influenza virus.  The vaccine is redeveloped each year to contain specific strains of inactivated (killed) flu virus that are recommended by public health officials for that year. The injectable influenza virus vaccine (flu shot) is a \"killed virus\" vaccine. Influenza virus vaccine is also available in a nasal spray form, which is a \"live virus\" vaccine. Influenza virus vaccine works by exposing you to a small dose of the virus, which helps your body to develop immunity to the disease. Influenza virus vaccine will not treat an active infection that has already developed in the body. Influenza virus vaccine is for use in adults and children who are at least 7 months old. Becoming infected with influenza is much more dangerous to your health than receiving this vaccine. Influenza causes thousands of deaths each year, and hundreds of thousands of hospitalizations. However, like any medicine, this vaccine can cause side effects but the risk of serious side effects is extremely low. Like any vaccine, influenza virus vaccine may not provide protection from disease in every person. This vaccine will not prevent illness caused by erika flu (\"bird flu\"). What should I discuss with my healthcare provider before receiving this vaccine? You may not be able to receive this vaccine if you are allergic to eggs, or if you have:  · a history of severe allergic reaction to a flu vaccine; or  · a history of Guillain-Dryden syndrome (within 6 weeks after receiving a flu vaccine). Tell your doctor if you have ever had:  · bleeding problems;  · a neurologic disorder or disease affecting the brain (or if this was a reaction to a previous vaccine);  · seizures;  · a weak immune system caused by disease, bone marrow transplant, or by using certain medicines or receiving cancer treatments; or  · an allergy to latex. You can still receive a vaccine if you have a minor cold. If you have a severe illness with a fever or any type of infection, wait until you get better before receiving this vaccine.   The Centers for Disease Control and Prevention recommends that pregnant women get a flu shot during any trimester of pregnancy to protect themselves and their  babies from flu. The nasal spray form of influenza vaccine is not recommended for use in pregnant women. It may not be safe to breastfeed while using this medicine. Ask your doctor about any risk. This vaccine should not be given to a child younger than 7 months old. How is this vaccine given? Some brands of this vaccine are made for use in adults and not in children. Your child's doctor can recommend the best influenza virus vaccine for your child. This vaccine is given as an injection (shot) into a muscle. You will receive this injection in a doctor's office or other clinic setting. You should receive a flu vaccine every year. Your immunity will gradually decrease over the 12 months after you receive the influenza virus vaccine. Children receiving this vaccine may need a booster shot one month after receiving the first vaccine. The influenza virus vaccine is usually given in October or November. Some people may need to have their vaccines earlier or later. Follow your doctor's instructions. Your doctor may recommend treating fever and pain with an aspirin-free pain reliever such as acetaminophen (Tylenol) or ibuprofen (Motrin, Advil, and others) when the shot is given and for the next 24 hours. Follow the label directions or your doctor's instructions about how much of this medicine to give your child. It is especially important to prevent fever from occurring in a child who has a seizure disorder such as epilepsy. What happens if I miss a dose? Since flu shots are usually given only one time per year, you will most likely not be on a dosing schedule. Call your doctor if you forget to receive your yearly flu shot in October or November. If your child misses a booster dose of this vaccine, call your doctor for instructions. What happens if I overdose?   An overdose of this vaccine is unlikely to (Coumadin, Jantoven);  · an oral, nasal, inhaled, or injectable steroid medicine;  · medications to treat psoriasis, rheumatoid arthritis, or other autoimmune disorders--azathioprine, etanercept, leflunomide, and others; or  · medicines to treat or prevent organ transplant rejection--basiliximab, cyclosporine, muromonab-CD3, mycophenolate mofetil, sirolimus, tacrolimus. This list is not complete. Other drugs may affect influenza virus vaccine, including prescription and over-the-counter medicines, vitamins, and herbal products. Not all possible drug interactions are listed here. Where can I get more information? Your doctor or pharmacist can provide more information about this vaccine. Additional information is available from your local health department or the Centers for Disease Control and Prevention. Remember, keep this and all other medicines out of the reach of children, never share your medicines with others, and use this medication only for the indication prescribed. Every effort has been made to ensure that the information provided by [address] is accurate, up-to-date, and complete, but no guarantee is made to that effect. Drug information contained herein may be time sensitive. [facility] information has been compiled for use by healthcare practitioners and consumers in the [place] and therefore [facility] does not warrant that uses outside of the [place] are appropriate, unless specifically indicated otherwise. [facility]'s drug information does not endorse drugs, diagnose patients or recommend therapy. [facility] drug information is an informational resource designed to assist licensed healthcare practitioners in caring for their patients and/or to serve consumers viewing this service as a supplement to, and not a substitute for, the expertise, skill, knowledge and judgment of healthcare practitioners.  The absence of a warning for a given drug or drug combination in no way should clinic may adjust your dose of warfarin. Follow-up care is a key part of your treatment and safety. Be sure to make and go to all appointments, and call your doctor if you are having problems. It's also a good idea to know your test results and keep a list of the medicines you take. How can you care for yourself at home? Take warfarin safely  · Take your warfarin at the same time each day. · If you miss a dose of warfarin, don't take an extra dose to make up for it. Your doctor can tell you exactly what to do so you don't take too much or too little. · Wear medical alert jewelry that lets others know that you take warfarin. You can buy this at most drugstores. · Don't take warfarin if you are pregnant or planning to get pregnant. Talk to your doctor about how you can prevent getting pregnant while you are taking it. · Don't change your dose or stop taking warfarin unless your doctor tells you to. Effects of medicines and food on warfarin  · Don't start or stop taking any medicines, vitamins, or natural remedies unless you first talk to your doctor. Many medicines can affect how warfarin works. These include aspirin and other pain relievers, over-the-counter medicines, multivitamins, dietary supplements, and herbal products. · Tell all of your doctors and pharmacists that you take warfarin. Some prescription medicines can affect how warfarin works. · Keep the amount of vitamin K in your diet about the same from day to day. Do not suddenly eat a lot more or a lot less food that is rich in vitamin K than you usually do. Vitamin K affects how warfarin works and how your blood clots. Talk with your doctor before making big changes in your diet. Foods that have a lot of vitamin K include cooked green vegetables, such as:  ? Kale, spinach, turnip greens, tasha greens, Swiss chard, and mustard greens. ? [place] sprouts, broccoli, and cabbage. · Limit your use of alcohol.   Avoid bleeding by preventing falls

## 2020-10-15 NOTE — PROGRESS NOTES
Subjective:      Patient ID: Chandrika Sapp is a 78 y.o. female. HPI    Chief Complaint   Patient presents with    Other     NTP     Treatment Adherence:   Medication compliance:  compliant all of the time  Diet compliance:  noncompliant: most of the time  Weight trend: stable  Current exercise: no regular exercise  Barriers: impairment:  physical: unsteady, ambulated with cane. Hypertension:  Home blood pressure monitoring: Yes - well controlled at home - 120's over 70's. Patient denies chest pain, shortness of breath, headache, lightheadedness, blurred vision, peripheral edema, palpitations, dry cough and fatigue. Antihypertensive medication side effects: no medication side effects noted. Use of agents associated with hypertension: none. Sodium (mmol/L)   Date Value   06/17/2020 138    BUN (mg/dL)   Date Value   06/17/2020 12    Glucose (mg/dL)   Date Value   06/17/2020 90      Potassium (mmol/L)   Date Value   06/17/2020 4.4    CREATININE (mg/dL)   Date Value   06/17/2020 0.8         Hyperlipidemia:  No new myalgias or GI upset on pravastatin (Pravachol). Lab Results   Component Value Date    CHOL 176 06/17/2020    TRIG 136 06/17/2020    HDL 44 06/17/2020    LDLCALC 105 (H) 06/17/2020     Lab Results   Component Value Date    ALT 16 12/11/2019    AST 16 12/11/2019        Anxiety  Patient is here for f/u of anxiety. She has the following anxiety symptoms: excessive worry at times. Onset of symptoms was approximately several years ago. Symptoms have been stable since that time. She denies current suicidal and homicidal ideation. Family history significant for unknown. Possible organic causes contributing are: none. Risk factors: previous episode of anxiety. Previous treatment includes medication Ativan. She complains of the following medication side effects: none.     Past Medical History:   Diagnosis Date    Adrenal mass ([address] 3/12    felt benign    Anxiety  Colon polyp     Tubular adenoma: TAC + 2/09, +2/11    Osteoarthritis 2/12    hip    Osteoporosis     alendronate treatment > 5 yrs    Routine health maintenance     +FH breast Cancer     Ureteral obstruction, right 3/12    ? chronic     Review of Systems   Constitutional: Negative for appetite change, chills and fever. HENT: Negative. Eyes: Negative. Respiratory: Negative. Cardiovascular: Negative. Negative for chest pain, palpitations and leg swelling. Gastrointestinal: Negative. Genitourinary: Negative. Musculoskeletal: Positive for gait problem (uses cane and one person assistance). Skin: Negative. Psychiatric/Behavioral: Negative. Patient Active Problem List   Diagnosis    Anxiety    Chronic midline low back pain without sciatica    Ex-smoker    Benign essential hypertension    Pure hypercholesterolemia    Intermittent atrial fibrillation (HCC)    Primary osteoarthritis of knees, bilateral    Late onset Alzheimer's disease without behavioral disturbance (HCC)       Outpatient Medications Marked as Taking for the 10/15/20 encounter (Office Visit) with ROBERT Zarate CNP   Medication Sig Dispense Refill    amLODIPine (NORVASC) 5 MG tablet TAKE 1 TABLET BY MOUTH DAILY 90 tablet 1    amLODIPine (NORVASC) 2.5 MG tablet TAKE 1 TABLET BY MOUTH EVERY DAY IN EVENING (ALSO TAKES A 5MG TABLET IN MORNING) 90 tablet 1    pravastatin (PRAVACHOL) 20 MG tablet TAKE 1 TABLET BY MOUTH EVERY DAY AT NIGHT 90 tablet 1    digoxin (LANOXIN) 125 MCG tablet TAKE 1 TABLET BY MOUTH EVERY DAY 90 tablet 1    LORazepam (ATIVAN) 0.5 MG tablet TAKE 1 TABLET BY MOUTH 2 TIMES DAILY FOR 90 DAYS.  180 tablet 0    pantoprazole (PROTONIX) 40 MG tablet TAKE 1 TABLET BY MOUTH EVERY DAY BEFORE BREAKFAST 90 tablet 1    warfarin (COUMADIN) 2.5 MG tablet TAKE 1 TAB DAILY EXCEPT 1/2 TAB ON SUNDAY 84 tablet 4    acetaminophen (TYLENOL) 500 MG tablet Take 1-2 tablets by mouth 3 times daily as hyperlipidemia, anxiety, A. fib, late onset Alzheimer's. Patient/daughter reports she is doing very well. Patient denies any acute problems or concerns. Blood pressure has been well controlled with current treatment. Order for repeat labs as below. Advised patient to continue with current medication regimen. - Comprehensive Metabolic Panel    2. Pure hypercholesterolemia  Reviewed lipids from 6/18/2020 which indicate fair control with current treatment. Patient denies any new myalgias or GI upset with pravastatin. Recommend patient continue with current treatment. 3. Anxiety  Stable with current treatment. Refill as below  - LORazepam (ATIVAN) 0.5 MG tablet; Take 1 tablet by mouth 2 times daily for 90 days. Dispense: 180 tablet; Refill: 0  Controlled Substance Monitoring:    Acute and Chronic Pain Monitoring:   RX Monitoring 10/15/2020   Attestation -   Periodic Controlled Substance Monitoring No signs of potential drug abuse or diversion identified. 4. Intermittent atrial fibrillation (HCC)  Follows at Coumadin clinic. Last INR was 2.7 on 9/20/2020. No signs or symptoms of bleeding. The patient instruction    5. Late onset Alzheimer's disease without behavioral disturbance (HCC)  Stable    6.  Need for immunization against influenza  - INFLUENZA, QUADV, ADJUVANTED, 65 YRS =, IM, PF, PREFILL SYR, 0.5ML (FLUAD)

## 2020-11-02 NOTE — PROGRESS NOTES
Ms. Feliciano Melvin is here for management of anticoagulation for AFib. PMH also significant for HTN and HLD. She presents today w/out complaint. Pt verifies dosing regimen as listed above. Pt denies s/s bleeding/bruising/swelling/SOB. No BRBPR. No melena. Denies missed doses. Reviewed home medications. No changes in RX/OTCs/Herbal medications. Reviewed dietary concerns   Denies EToH and tobacco use. No changes per pt    Pt seen in office  97.5 deg F    INR 2.2 is within acceptable therapeutic range of 2-3  Recommend to continue with 2.5 mg daily except 3.75 mg on Mon  Patient has 2.5 mg tablets. Will continue to monitor and check INR in 6 weeks. Dosing reminder card given with phone number, appointment date and time.    Return to clinic:  12/14/20 @ 9:30 AM    Stef Rose PharmD 9:45 AM EST 11/2/20

## 2020-11-28 NOTE — TELEPHONE ENCOUNTER
OK to phone prescription in to pharmacy. [facility] ([facility] and [facility] reports) for the last 12 months was requested and reviewed today. The results of the report was consistent with the current treatment plan. Statement Selected

## 2020-12-14 NOTE — PROGRESS NOTES
Ms. Slime Dyer is here for management of anticoagulation for AFib. PMH also significant for HTN and HLD. She presents today w/out complaint. Pt verifies dosing regimen as listed above. Pt denies s/s bleeding/bruising/swelling/SOB. No BRBPR. No melena. Denies missed doses. Reviewed home medications. No changes in RX/OTCs/Herbal medications. Reviewed dietary concerns   Denies EToH and tobacco use. No changes per pt    Pt seen in office  97.5 deg F    INR 2.4 is within acceptable therapeutic range of 2-3  Recommend to continue with 2.5 mg daily except 3.75 mg on Mon  Patient has 2.5 mg tablets. Will continue to monitor and check INR in 6 weeks. Dosing reminder card given with phone number, appointment date and time.    Return to clinic:  1/25/21 @ 9:30 AM    Vickie Cutler PharmD 10:02 AM EST 12/14/20

## 2021-01-01 ENCOUNTER — TELEPHONE (OUTPATIENT)
Dept: FAMILY MEDICINE CLINIC | Age: 81
End: 2021-01-01

## 2021-01-01 ENCOUNTER — APPOINTMENT (OUTPATIENT)
Dept: GENERAL RADIOLOGY | Age: 81
End: 2021-01-01
Payer: MEDICARE

## 2021-01-01 ENCOUNTER — OFFICE VISIT (OUTPATIENT)
Dept: FAMILY MEDICINE CLINIC | Age: 81
End: 2021-01-01
Payer: MEDICARE

## 2021-01-01 ENCOUNTER — ANTI-COAG VISIT (OUTPATIENT)
Dept: PHARMACY | Age: 81
End: 2021-01-01
Payer: MEDICARE

## 2021-01-01 ENCOUNTER — HOSPITAL ENCOUNTER (EMERGENCY)
Age: 81
Discharge: NURSING, REHAB OR LONG-TERM CARE | End: 2021-06-15
Attending: EMERGENCY MEDICINE
Payer: MEDICARE

## 2021-01-01 ENCOUNTER — APPOINTMENT (OUTPATIENT)
Dept: NUCLEAR MEDICINE | Age: 81
End: 2021-01-01
Payer: MEDICARE

## 2021-01-01 ENCOUNTER — HOSPITAL ENCOUNTER (OUTPATIENT)
Age: 81
Setting detail: OBSERVATION
Discharge: NURSING, REHAB OR LONG-TERM CARE | End: 2021-06-05
Attending: STUDENT IN AN ORGANIZED HEALTH CARE EDUCATION/TRAINING PROGRAM | Admitting: INTERNAL MEDICINE
Payer: MEDICARE

## 2021-01-01 ENCOUNTER — APPOINTMENT (OUTPATIENT)
Dept: CT IMAGING | Age: 81
End: 2021-01-01
Payer: MEDICARE

## 2021-01-01 VITALS
WEIGHT: 160 LBS | RESPIRATION RATE: 31 BRPM | BODY MASS INDEX: 24.25 KG/M2 | HEART RATE: 88 BPM | SYSTOLIC BLOOD PRESSURE: 87 MMHG | OXYGEN SATURATION: 100 % | HEIGHT: 68 IN | DIASTOLIC BLOOD PRESSURE: 49 MMHG | TEMPERATURE: 101.6 F

## 2021-01-01 VITALS
RESPIRATION RATE: 16 BRPM | OXYGEN SATURATION: 90 % | WEIGHT: 157 LBS | HEART RATE: 72 BPM | DIASTOLIC BLOOD PRESSURE: 75 MMHG | HEIGHT: 68 IN | TEMPERATURE: 99.7 F | BODY MASS INDEX: 23.79 KG/M2 | SYSTOLIC BLOOD PRESSURE: 138 MMHG

## 2021-01-01 VITALS
SYSTOLIC BLOOD PRESSURE: 130 MMHG | BODY MASS INDEX: 27.6 KG/M2 | HEART RATE: 77 BPM | DIASTOLIC BLOOD PRESSURE: 77 MMHG | WEIGHT: 176.2 LBS | TEMPERATURE: 98 F | OXYGEN SATURATION: 98 %

## 2021-01-01 DIAGNOSIS — F41.9 ANXIETY: ICD-10-CM

## 2021-01-01 DIAGNOSIS — E87.0 HYPERNATREMIA: ICD-10-CM

## 2021-01-01 DIAGNOSIS — I48.0 INTERMITTENT ATRIAL FIBRILLATION (HCC): ICD-10-CM

## 2021-01-01 DIAGNOSIS — M19.90 OSTEOARTHRITIS, UNSPECIFIED OSTEOARTHRITIS TYPE, UNSPECIFIED SITE: ICD-10-CM

## 2021-01-01 DIAGNOSIS — R53.1 GENERAL WEAKNESS: Primary | ICD-10-CM

## 2021-01-01 DIAGNOSIS — I10 BENIGN ESSENTIAL HYPERTENSION: ICD-10-CM

## 2021-01-01 DIAGNOSIS — J96.01 SEPSIS WITH ACUTE HYPOXIC RESPIRATORY FAILURE WITHOUT SEPTIC SHOCK, DUE TO UNSPECIFIED ORGANISM (HCC): ICD-10-CM

## 2021-01-01 DIAGNOSIS — J18.9 HCAP (HEALTHCARE-ASSOCIATED PNEUMONIA): Primary | ICD-10-CM
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
## 2021-01-01 DIAGNOSIS — R65.20 SEPSIS WITH ACUTE HYPOXIC RESPIRATORY FAILURE WITHOUT SEPTIC SHOCK, DUE TO UNSPECIFIED ORGANISM (HCC): ICD-10-CM

## 2021-01-01 DIAGNOSIS — A41.9 SEPSIS WITH ACUTE HYPOXIC RESPIRATORY FAILURE WITHOUT SEPTIC SHOCK, DUE TO UNSPECIFIED ORGANISM (HCC): ICD-10-CM

## 2021-01-01 DIAGNOSIS — E78.00 PURE HYPERCHOLESTEROLEMIA: ICD-10-CM

## 2021-01-01 DIAGNOSIS — E86.0 DEHYDRATION: ICD-10-CM

## 2021-01-01 DIAGNOSIS — S70.02XA CONTUSION OF LEFT HIP, INITIAL ENCOUNTER: ICD-10-CM

## 2021-01-01 DIAGNOSIS — F41.9 ANXIETY: Primary | ICD-10-CM

## 2021-01-01 DIAGNOSIS — J96.01 ACUTE RESPIRATORY FAILURE WITH HYPOXIA (HCC): ICD-10-CM

## 2021-01-01 LAB
A/G RATIO: 1 (ref 1.1–2.2)
A/G RATIO: 1.3 (ref 1.1–2.2)
A/G RATIO: 1.6 (ref 1.1–2.2)
ALBUMIN SERPL-MCNC: 3.6 G/DL (ref 3.4–5)
ALBUMIN SERPL-MCNC: 4 G/DL (ref 3.4–5)
ALBUMIN SERPL-MCNC: 4.1 G/DL (ref 3.4–5)
ALP BLD-CCNC: 101 U/L (ref 40–129)
ALP BLD-CCNC: 109 U/L (ref 40–129)
ALP BLD-CCNC: 230 U/L (ref 40–129)
ALT SERPL-CCNC: 12 U/L (ref 10–40)
ALT SERPL-CCNC: 17 U/L (ref 10–40)
ALT SERPL-CCNC: 74 U/L (ref 10–40)
ANION GAP SERPL CALCULATED.3IONS-SCNC: 11 MMOL/L (ref 3–16)
ANION GAP SERPL CALCULATED.3IONS-SCNC: 12 MMOL/L (ref 3–16)
ANION GAP SERPL CALCULATED.3IONS-SCNC: 17 MMOL/L (ref 3–16)
ANION GAP SERPL CALCULATED.3IONS-SCNC: 8 MMOL/L (ref 3–16)
ANION GAP SERPL CALCULATED.3IONS-SCNC: 9 MMOL/L (ref 3–16)
AST SERPL-CCNC: 15 U/L (ref 15–37)
AST SERPL-CCNC: 19 U/L (ref 15–37)
AST SERPL-CCNC: 54 U/L (ref 15–37)
BASE EXCESS VENOUS: 0.5 MMOL/L (ref -3–3)
BASOPHILS ABSOLUTE: 0.1 K/UL (ref 0–0.2)
BASOPHILS ABSOLUTE: 0.2 K/UL (ref 0–0.2)
BASOPHILS ABSOLUTE: 0.4 K/UL (ref 0–0.2)
BASOPHILS RELATIVE PERCENT: 0.7 %
BASOPHILS RELATIVE PERCENT: 1 %
BASOPHILS RELATIVE PERCENT: 1.1 %
BASOPHILS RELATIVE PERCENT: 1.4 %
BASOPHILS RELATIVE PERCENT: 1.6 %
BILIRUB SERPL-MCNC: 0.4 MG/DL (ref 0–1)
BILIRUB SERPL-MCNC: 0.9 MG/DL (ref 0–1)
BILIRUB SERPL-MCNC: 1.5 MG/DL (ref 0–1)
BILIRUBIN URINE: NEGATIVE
BLOOD, URINE: NEGATIVE
BUN BLDV-MCNC: 15 MG/DL (ref 7–20)
BUN BLDV-MCNC: 18 MG/DL (ref 7–20)
BUN BLDV-MCNC: 18 MG/DL (ref 7–20)
BUN BLDV-MCNC: 19 MG/DL (ref 7–20)
BUN BLDV-MCNC: 74 MG/DL (ref 7–20)
CALCIUM SERPL-MCNC: 8.6 MG/DL (ref 8.3–10.6)
CALCIUM SERPL-MCNC: 8.9 MG/DL (ref 8.3–10.6)
CALCIUM SERPL-MCNC: 9.2 MG/DL (ref 8.3–10.6)
CALCIUM SERPL-MCNC: 9.3 MG/DL (ref 8.3–10.6)
CALCIUM SERPL-MCNC: 9.4 MG/DL (ref 8.3–10.6)
CARBOXYHEMOGLOBIN: 0.8 % (ref 0–1.5)
CHLORIDE BLD-SCNC: 100 MMOL/L (ref 99–110)
CHLORIDE BLD-SCNC: 101 MMOL/L (ref 99–110)
CHLORIDE BLD-SCNC: 103 MMOL/L (ref 99–110)
CHLORIDE BLD-SCNC: 105 MMOL/L (ref 99–110)
CHLORIDE BLD-SCNC: 131 MMOL/L (ref 99–110)
CLARITY: CLEAR
CO2: 24 MMOL/L (ref 21–32)
CO2: 25 MMOL/L (ref 21–32)
CO2: 26 MMOL/L (ref 21–32)
CO2: 27 MMOL/L (ref 21–32)
CO2: 27 MMOL/L (ref 21–32)
COLOR: YELLOW
CREAT SERPL-MCNC: 0.7 MG/DL (ref 0.6–1.2)
CREAT SERPL-MCNC: 0.8 MG/DL (ref 0.6–1.2)
CREAT SERPL-MCNC: 0.9 MG/DL (ref 0.6–1.2)
CREAT SERPL-MCNC: 1 MG/DL (ref 0.6–1.2)
CREAT SERPL-MCNC: 1.8 MG/DL (ref 0.6–1.2)
DIGOXIN LEVEL: 0.9 NG/ML (ref 0.8–2)
EKG ATRIAL RATE: 65 BPM
EKG ATRIAL RATE: 82 BPM
EKG ATRIAL RATE: 96 BPM
EKG DIAGNOSIS: NORMAL
EKG P AXIS: 69 DEGREES
EKG P AXIS: 70 DEGREES
EKG P AXIS: 82 DEGREES
EKG P-R INTERVAL: 118 MS
EKG P-R INTERVAL: 140 MS
EKG P-R INTERVAL: 142 MS
EKG Q-T INTERVAL: 370 MS
EKG Q-T INTERVAL: 378 MS
EKG Q-T INTERVAL: 384 MS
EKG QRS DURATION: 76 MS
EKG QRS DURATION: 80 MS
EKG QRS DURATION: 80 MS
EKG QTC CALCULATION (BAZETT): 393 MS
EKG QTC CALCULATION (BAZETT): 448 MS
EKG QTC CALCULATION (BAZETT): 467 MS
EKG R AXIS: -61 DEGREES
EKG R AXIS: 18 DEGREES
EKG R AXIS: 8 DEGREES
EKG T AXIS: 115 DEGREES
EKG T AXIS: 76 DEGREES
EKG T AXIS: 79 DEGREES
EKG VENTRICULAR RATE: 65 BPM
EKG VENTRICULAR RATE: 82 BPM
EKG VENTRICULAR RATE: 96 BPM
EOSINOPHILS ABSOLUTE: 0 K/UL (ref 0–0.6)
EOSINOPHILS ABSOLUTE: 0.1 K/UL (ref 0–0.6)
EOSINOPHILS ABSOLUTE: 0.3 K/UL (ref 0–0.6)
EOSINOPHILS RELATIVE PERCENT: 0 %
EOSINOPHILS RELATIVE PERCENT: 0.2 %
EOSINOPHILS RELATIVE PERCENT: 0.4 %
EOSINOPHILS RELATIVE PERCENT: 0.7 %
EOSINOPHILS RELATIVE PERCENT: 2.7 %
EPITHELIAL CELLS, UA: ABNORMAL /HPF (ref 0–5)
GFR AFRICAN AMERICAN: 33
GFR AFRICAN AMERICAN: >60
GFR NON-AFRICAN AMERICAN: 27
GFR NON-AFRICAN AMERICAN: 53
GFR NON-AFRICAN AMERICAN: 60
GFR NON-AFRICAN AMERICAN: >60
GFR NON-AFRICAN AMERICAN: >60
GLOBULIN: 2.6 G/DL
GLOBULIN: 3.1 G/DL
GLOBULIN: 3.6 G/DL
GLUCOSE BLD-MCNC: 104 MG/DL (ref 70–99)
GLUCOSE BLD-MCNC: 119 MG/DL (ref 70–99)
GLUCOSE BLD-MCNC: 123 MG/DL (ref 70–99)
GLUCOSE BLD-MCNC: 164 MG/DL (ref 70–99)
GLUCOSE BLD-MCNC: 91 MG/DL (ref 70–99)
GLUCOSE URINE: NEGATIVE MG/DL
HCO3 VENOUS: 26.6 MMOL/L (ref 23–29)
HCT VFR BLD CALC: 37.7 % (ref 36–48)
HCT VFR BLD CALC: 37.7 % (ref 36–48)
HCT VFR BLD CALC: 39.1 % (ref 36–48)
HCT VFR BLD CALC: 40.7 % (ref 36–48)
HCT VFR BLD CALC: 41.9 % (ref 36–48)
HCT VFR BLD CALC: 55 % (ref 36–48)
HEMOGLOBIN: 12.3 G/DL (ref 12–16)
HEMOGLOBIN: 12.4 G/DL (ref 12–16)
HEMOGLOBIN: 12.9 G/DL (ref 12–16)
HEMOGLOBIN: 13.3 G/DL (ref 12–16)
HEMOGLOBIN: 13.7 G/DL (ref 12–16)
HEMOGLOBIN: 16.5 G/DL (ref 12–16)
HYALINE CASTS: ABNORMAL /LPF (ref 0–2)
INR BLD: 1.99 (ref 0.86–1.14)
INR BLD: 2.13 (ref 0.86–1.14)
INR BLD: 2.21 (ref 0.86–1.14)
INR BLD: 2.22 (ref 0.86–1.14)
INR BLD: 5.53 (ref 0.86–1.14)
INTERNATIONAL NORMALIZATION RATIO, POC: 2.4
INTERNATIONAL NORMALIZATION RATIO, POC: 2.4
INTERNATIONAL NORMALIZATION RATIO, POC: 2.7
KETONES, URINE: NEGATIVE MG/DL
LACTIC ACID, SEPSIS: 6.5 MMOL/L (ref 0.4–1.9)
LEUKOCYTE ESTERASE, URINE: ABNORMAL
LV EF: 65 %
LVEF MODALITY: NORMAL
LYMPHOCYTES ABSOLUTE: 1.9 K/UL (ref 1–5.1)
LYMPHOCYTES ABSOLUTE: 1.9 K/UL (ref 1–5.1)
LYMPHOCYTES ABSOLUTE: 2.1 K/UL (ref 1–5.1)
LYMPHOCYTES ABSOLUTE: 2.3 K/UL (ref 1–5.1)
LYMPHOCYTES ABSOLUTE: 3 K/UL (ref 1–5.1)
LYMPHOCYTES RELATIVE PERCENT: 14 %
LYMPHOCYTES RELATIVE PERCENT: 15.1 %
LYMPHOCYTES RELATIVE PERCENT: 19.3 %
LYMPHOCYTES RELATIVE PERCENT: 29 %
LYMPHOCYTES RELATIVE PERCENT: 7 %
MCH RBC QN AUTO: 27.6 PG (ref 26–34)
MCH RBC QN AUTO: 28.5 PG (ref 26–34)
MCH RBC QN AUTO: 28.6 PG (ref 26–34)
MCH RBC QN AUTO: 28.6 PG (ref 26–34)
MCH RBC QN AUTO: 29 PG (ref 26–34)
MCH RBC QN AUTO: 29 PG (ref 26–34)
MCHC RBC AUTO-ENTMCNC: 29.9 G/DL (ref 31–36)
MCHC RBC AUTO-ENTMCNC: 32.6 G/DL (ref 31–36)
MCHC RBC AUTO-ENTMCNC: 32.7 G/DL (ref 31–36)
MCHC RBC AUTO-ENTMCNC: 32.7 G/DL (ref 31–36)
MCHC RBC AUTO-ENTMCNC: 32.9 G/DL (ref 31–36)
MCHC RBC AUTO-ENTMCNC: 32.9 G/DL (ref 31–36)
MCV RBC AUTO: 87.4 FL (ref 80–100)
MCV RBC AUTO: 87.4 FL (ref 80–100)
MCV RBC AUTO: 87.5 FL (ref 80–100)
MCV RBC AUTO: 88 FL (ref 80–100)
MCV RBC AUTO: 88.2 FL (ref 80–100)
MCV RBC AUTO: 92.2 FL (ref 80–100)
METHEMOGLOBIN VENOUS: 0.3 %
MICROSCOPIC EXAMINATION: YES
MONOCYTES ABSOLUTE: 0.9 K/UL (ref 0–1.3)
MONOCYTES ABSOLUTE: 1.1 K/UL (ref 0–1.3)
MONOCYTES ABSOLUTE: 1.2 K/UL (ref 0–1.3)
MONOCYTES ABSOLUTE: 1.3 K/UL (ref 0–1.3)
MONOCYTES ABSOLUTE: 1.3 K/UL (ref 0–1.3)
MONOCYTES RELATIVE PERCENT: 10.2 %
MONOCYTES RELATIVE PERCENT: 4.7 %
MONOCYTES RELATIVE PERCENT: 7.5 %
MONOCYTES RELATIVE PERCENT: 9.2 %
MONOCYTES RELATIVE PERCENT: 9.6 %
MUCUS: ABNORMAL /LPF
NEUTROPHILS ABSOLUTE: 10.6 K/UL (ref 1.7–7.7)
NEUTROPHILS ABSOLUTE: 23.4 K/UL (ref 1.7–7.7)
NEUTROPHILS ABSOLUTE: 6 K/UL (ref 1.7–7.7)
NEUTROPHILS ABSOLUTE: 8.2 K/UL (ref 1.7–7.7)
NEUTROPHILS ABSOLUTE: 9.9 K/UL (ref 1.7–7.7)
NEUTROPHILS RELATIVE PERCENT: 58.4 %
NEUTROPHILS RELATIVE PERCENT: 68.7 %
NEUTROPHILS RELATIVE PERCENT: 75 %
NEUTROPHILS RELATIVE PERCENT: 75.8 %
NEUTROPHILS RELATIVE PERCENT: 86.7 %
NITRITE, URINE: NEGATIVE
O2 CONTENT, VEN: 12 VOL %
O2 SAT, VEN: 52 %
O2 THERAPY: NORMAL
PCO2, VEN: 47.9 MMHG (ref 40–50)
PDW BLD-RTO: 14.3 % (ref 12.4–15.4)
PDW BLD-RTO: 14.3 % (ref 12.4–15.4)
PDW BLD-RTO: 14.4 % (ref 12.4–15.4)
PDW BLD-RTO: 14.6 % (ref 12.4–15.4)
PDW BLD-RTO: 15 % (ref 12.4–15.4)
PDW BLD-RTO: 15.7 % (ref 12.4–15.4)
PH UA: 6 (ref 5–8)
PH VENOUS: 7.36 (ref 7.35–7.45)
PLATELET # BLD: 285 K/UL (ref 135–450)
PLATELET # BLD: 285 K/UL (ref 135–450)
PLATELET # BLD: 299 K/UL (ref 135–450)
PLATELET # BLD: 317 K/UL (ref 135–450)
PLATELET # BLD: 336 K/UL (ref 135–450)
PLATELET # BLD: 457 K/UL (ref 135–450)
PMV BLD AUTO: 10.3 FL (ref 5–10.5)
PMV BLD AUTO: 10.8 FL (ref 5–10.5)
PMV BLD AUTO: 9.4 FL (ref 5–10.5)
PMV BLD AUTO: 9.5 FL (ref 5–10.5)
PMV BLD AUTO: 9.7 FL (ref 5–10.5)
PMV BLD AUTO: 9.9 FL (ref 5–10.5)
PO2, VEN: 29.1 MMHG (ref 25–40)
POTASSIUM REFLEX MAGNESIUM: 3.8 MMOL/L (ref 3.5–5.1)
POTASSIUM REFLEX MAGNESIUM: 3.9 MMOL/L (ref 3.5–5.1)
POTASSIUM REFLEX MAGNESIUM: 4.4 MMOL/L (ref 3.5–5.1)
POTASSIUM REFLEX MAGNESIUM: 4.7 MMOL/L (ref 3.5–5.1)
POTASSIUM SERPL-SCNC: 4.4 MMOL/L (ref 3.5–5.1)
PRO-BNP: 433 PG/ML (ref 0–449)
PROCALCITONIN: 0.14 NG/ML (ref 0–0.15)
PROTEIN UA: NEGATIVE MG/DL
PROTHROMBIN TIME: 23.3 SEC (ref 10–13.2)
PROTHROMBIN TIME: 24.9 SEC (ref 10–13.2)
PROTHROMBIN TIME: 25.8 SEC (ref 10–13.2)
PROTHROMBIN TIME: 26 SEC (ref 10–13.2)
PROTHROMBIN TIME: 61 SEC (ref 10–13.2)
RBC # BLD: 4.28 M/UL (ref 4–5.2)
RBC # BLD: 4.31 M/UL (ref 4–5.2)
RBC # BLD: 4.44 M/UL (ref 4–5.2)
RBC # BLD: 4.66 M/UL (ref 4–5.2)
RBC # BLD: 4.79 M/UL (ref 4–5.2)
RBC # BLD: 5.97 M/UL (ref 4–5.2)
RBC UA: ABNORMAL /HPF (ref 0–4)
SARS-COV-2, NAAT: NOT DETECTED
SODIUM BLD-SCNC: 134 MMOL/L (ref 136–145)
SODIUM BLD-SCNC: 137 MMOL/L (ref 136–145)
SODIUM BLD-SCNC: 139 MMOL/L (ref 136–145)
SODIUM BLD-SCNC: 141 MMOL/L (ref 136–145)
SODIUM BLD-SCNC: 175 MMOL/L (ref 136–145)
SPECIFIC GRAVITY UA: 1.01 (ref 1–1.03)
TCO2 CALC VENOUS: 28 MMOL/L
TOTAL PROTEIN: 6.7 G/DL (ref 6.4–8.2)
TOTAL PROTEIN: 7.1 G/DL (ref 6.4–8.2)
TOTAL PROTEIN: 7.2 G/DL (ref 6.4–8.2)
TROPONIN: 0.05 NG/ML
TROPONIN: <0.01 NG/ML
URINE REFLEX TO CULTURE: ABNORMAL
URINE TYPE: ABNORMAL
UROBILINOGEN, URINE: 1 E.U./DL
WBC # BLD: 10.3 K/UL (ref 4–11)
WBC # BLD: 11.9 K/UL (ref 4–11)
WBC # BLD: 13 K/UL (ref 4–11)
WBC # BLD: 13.2 K/UL (ref 4–11)
WBC # BLD: 14 K/UL (ref 4–11)
WBC # BLD: 27 K/UL (ref 4–11)
WBC UA: ABNORMAL /HPF (ref 0–5)

## 2021-01-01 PROCEDURE — 93010 ELECTROCARDIOGRAM REPORT: CPT | Performed by: INTERNAL MEDICINE

## 2021-01-01 PROCEDURE — 96366 THER/PROPH/DIAG IV INF ADDON: CPT

## 2021-01-01 PROCEDURE — 6370000000 HC RX 637 (ALT 250 FOR IP): Performed by: EMERGENCY MEDICINE

## 2021-01-01 PROCEDURE — 93017 CV STRESS TEST TRACING ONLY: CPT

## 2021-01-01 PROCEDURE — 6370000000 HC RX 637 (ALT 250 FOR IP): Performed by: PHYSICIAN ASSISTANT

## 2021-01-01 PROCEDURE — 81001 URINALYSIS AUTO W/SCOPE: CPT

## 2021-01-01 PROCEDURE — 99219 PR INITIAL OBSERVATION CARE/DAY 50 MINUTES: CPT | Performed by: PHYSICIAN ASSISTANT

## 2021-01-01 PROCEDURE — 1200000000 HC SEMI PRIVATE

## 2021-01-01 PROCEDURE — G0378 HOSPITAL OBSERVATION PER HR: HCPCS

## 2021-01-01 PROCEDURE — 83605 ASSAY OF LACTIC ACID: CPT

## 2021-01-01 PROCEDURE — 84484 ASSAY OF TROPONIN QUANT: CPT

## 2021-01-01 PROCEDURE — 99211 OFF/OP EST MAY X REQ PHY/QHP: CPT | Performed by: PHARMACIST

## 2021-01-01 PROCEDURE — 36415 COLL VENOUS BLD VENIPUNCTURE: CPT | Performed by: NURSE PRACTITIONER

## 2021-01-01 PROCEDURE — 99285 EMERGENCY DEPT VISIT HI MDM: CPT

## 2021-01-01 PROCEDURE — 97165 OT EVAL LOW COMPLEX 30 MIN: CPT

## 2021-01-01 PROCEDURE — 84145 PROCALCITONIN (PCT): CPT

## 2021-01-01 PROCEDURE — 80048 BASIC METABOLIC PNL TOTAL CA: CPT

## 2021-01-01 PROCEDURE — 36415 COLL VENOUS BLD VENIPUNCTURE: CPT

## 2021-01-01 PROCEDURE — 96365 THER/PROPH/DIAG IV INF INIT: CPT

## 2021-01-01 PROCEDURE — 70450 CT HEAD/BRAIN W/O DYE: CPT

## 2021-01-01 PROCEDURE — 85610 PROTHROMBIN TIME: CPT

## 2021-01-01 PROCEDURE — 85025 COMPLETE CBC W/AUTO DIFF WBC: CPT

## 2021-01-01 PROCEDURE — 87040 BLOOD CULTURE FOR BACTERIA: CPT

## 2021-01-01 PROCEDURE — A9502 TC99M TETROFOSMIN: HCPCS | Performed by: INTERNAL MEDICINE

## 2021-01-01 PROCEDURE — 99232 SBSQ HOSP IP/OBS MODERATE 35: CPT | Performed by: INTERNAL MEDICINE

## 2021-01-01 PROCEDURE — 80053 COMPREHEN METABOLIC PANEL: CPT

## 2021-01-01 PROCEDURE — 85610 PROTHROMBIN TIME: CPT | Performed by: PHARMACIST

## 2021-01-01 PROCEDURE — 6370000000 HC RX 637 (ALT 250 FOR IP): Performed by: NURSE PRACTITIONER

## 2021-01-01 PROCEDURE — 73562 X-RAY EXAM OF KNEE 3: CPT

## 2021-01-01 PROCEDURE — 73130 X-RAY EXAM OF HAND: CPT

## 2021-01-01 PROCEDURE — 99221 1ST HOSP IP/OBS SF/LOW 40: CPT | Performed by: PHYSICIAN ASSISTANT

## 2021-01-01 PROCEDURE — 97530 THERAPEUTIC ACTIVITIES: CPT

## 2021-01-01 PROCEDURE — 6360000002 HC RX W HCPCS: Performed by: EMERGENCY MEDICINE

## 2021-01-01 PROCEDURE — 96361 HYDRATE IV INFUSION ADD-ON: CPT

## 2021-01-01 PROCEDURE — 82803 BLOOD GASES ANY COMBINATION: CPT

## 2021-01-01 PROCEDURE — 3430000000 HC RX DIAGNOSTIC RADIOPHARMACEUTICAL: Performed by: INTERNAL MEDICINE

## 2021-01-01 PROCEDURE — 83880 ASSAY OF NATRIURETIC PEPTIDE: CPT

## 2021-01-01 PROCEDURE — 2580000003 HC RX 258: Performed by: NURSE PRACTITIONER

## 2021-01-01 PROCEDURE — 72170 X-RAY EXAM OF PELVIS: CPT

## 2021-01-01 PROCEDURE — 71045 X-RAY EXAM CHEST 1 VIEW: CPT

## 2021-01-01 PROCEDURE — 78452 HT MUSCLE IMAGE SPECT MULT: CPT

## 2021-01-01 PROCEDURE — 93005 ELECTROCARDIOGRAM TRACING: CPT | Performed by: PHYSICIAN ASSISTANT

## 2021-01-01 PROCEDURE — 6360000002 HC RX W HCPCS: Performed by: PHYSICIAN ASSISTANT

## 2021-01-01 PROCEDURE — 97116 GAIT TRAINING THERAPY: CPT

## 2021-01-01 PROCEDURE — 99225 PR SBSQ OBSERVATION CARE/DAY 25 MINUTES: CPT | Performed by: INTERNAL MEDICINE

## 2021-01-01 PROCEDURE — 99217 PR OBSERVATION CARE DISCHARGE MANAGEMENT: CPT | Performed by: INTERNAL MEDICINE

## 2021-01-01 PROCEDURE — 2580000003 HC RX 258: Performed by: EMERGENCY MEDICINE

## 2021-01-01 PROCEDURE — 87635 SARS-COV-2 COVID-19 AMP PRB: CPT

## 2021-01-01 PROCEDURE — 96367 TX/PROPH/DG ADDL SEQ IV INF: CPT

## 2021-01-01 PROCEDURE — 85027 COMPLETE CBC AUTOMATED: CPT

## 2021-01-01 PROCEDURE — 93005 ELECTROCARDIOGRAM TRACING: CPT | Performed by: EMERGENCY MEDICINE

## 2021-01-01 PROCEDURE — 99214 OFFICE O/P EST MOD 30 MIN: CPT | Performed by: NURSE PRACTITIONER

## 2021-01-01 PROCEDURE — 93005 ELECTROCARDIOGRAM TRACING: CPT | Performed by: NURSE PRACTITIONER

## 2021-01-01 PROCEDURE — 97162 PT EVAL MOD COMPLEX 30 MIN: CPT

## 2021-01-01 RX ORDER — LORAZEPAM 0.5 MG/1
0.5 TABLET ORAL 2 TIMES DAILY
Qty: 180 TABLET | Refills: 0 | Status: SHIPPED | OUTPATIENT
Start: 2021-01-01 | End: 2021-01-01 | Stop reason: SDUPTHER

## 2021-01-01 RX ORDER — PRAVASTATIN SODIUM 20 MG
20 TABLET ORAL NIGHTLY
Status: DISCONTINUED | OUTPATIENT
Start: 2021-01-01 | End: 2021-01-01 | Stop reason: HOSPADM

## 2021-01-01 RX ORDER — OMEPRAZOLE 20 MG/1
20 CAPSULE, DELAYED RELEASE ORAL DAILY
COMMUNITY

## 2021-01-01 RX ORDER — DIGOXIN 125 MCG
TABLET ORAL
Qty: 90 TABLET | Refills: 3 | Status: SHIPPED | OUTPATIENT
Start: 2021-01-01

## 2021-01-01 RX ORDER — AMLODIPINE BESYLATE 5 MG/1
5 TABLET ORAL DAILY
Status: DISCONTINUED | OUTPATIENT
Start: 2021-01-01 | End: 2021-01-01 | Stop reason: HOSPADM

## 2021-01-01 RX ORDER — PRAVASTATIN SODIUM 20 MG
TABLET ORAL
Qty: 90 TABLET | Refills: 3 | Status: SHIPPED | OUTPATIENT
Start: 2021-01-01

## 2021-01-01 RX ORDER — PANTOPRAZOLE SODIUM 40 MG/1
40 TABLET, DELAYED RELEASE ORAL
Status: DISCONTINUED | OUTPATIENT
Start: 2021-01-01 | End: 2021-01-01 | Stop reason: HOSPADM

## 2021-01-01 RX ORDER — 0.9 % SODIUM CHLORIDE 0.9 %
30 INTRAVENOUS SOLUTION INTRAVENOUS ONCE
Status: DISCONTINUED | OUTPATIENT
Start: 2021-01-01 | End: 2021-01-01

## 2021-01-01 RX ORDER — LORAZEPAM 0.5 MG/1
0.5 TABLET ORAL 2 TIMES DAILY
Qty: 180 TABLET | Refills: 3 | Status: ON HOLD | OUTPATIENT
Start: 2021-01-01 | End: 2021-01-01 | Stop reason: SDUPTHER

## 2021-01-01 RX ORDER — SODIUM CHLORIDE 9 MG/ML
25 INJECTION, SOLUTION INTRAVENOUS PRN
Status: DISCONTINUED | OUTPATIENT
Start: 2021-01-01 | End: 2021-01-01 | Stop reason: HOSPADM

## 2021-01-01 RX ORDER — WARFARIN SODIUM 2.5 MG/1
2.5 TABLET ORAL
Status: DISCONTINUED | OUTPATIENT
Start: 2021-01-01 | End: 2021-01-01 | Stop reason: HOSPADM

## 2021-01-01 RX ORDER — ACETAMINOPHEN 650 MG/1
650 SUPPOSITORY RECTAL EVERY 6 HOURS PRN
Status: DISCONTINUED | OUTPATIENT
Start: 2021-01-01 | End: 2021-01-01 | Stop reason: HOSPADM

## 2021-01-01 RX ORDER — POLYETHYLENE GLYCOL 3350 17 G/17G
17 POWDER, FOR SOLUTION ORAL DAILY PRN
Status: DISCONTINUED | OUTPATIENT
Start: 2021-01-01 | End: 2021-01-01 | Stop reason: HOSPADM

## 2021-01-01 RX ORDER — PANTOPRAZOLE SODIUM 40 MG/1
TABLET, DELAYED RELEASE ORAL
Qty: 90 TABLET | Refills: 3 | Status: SHIPPED | OUTPATIENT
Start: 2021-01-01 | End: 2021-01-01

## 2021-01-01 RX ORDER — AMLODIPINE BESYLATE 5 MG/1
TABLET ORAL
Qty: 90 TABLET | Refills: 3 | Status: SHIPPED | OUTPATIENT
Start: 2021-01-01

## 2021-01-01 RX ORDER — ONDANSETRON 4 MG/1
4 TABLET, ORALLY DISINTEGRATING ORAL EVERY 8 HOURS PRN
Status: DISCONTINUED | OUTPATIENT
Start: 2021-01-01 | End: 2021-01-01 | Stop reason: HOSPADM

## 2021-01-01 RX ORDER — SODIUM CHLORIDE 0.9 % (FLUSH) 0.9 %
5-40 SYRINGE (ML) INJECTION PRN
Status: DISCONTINUED | OUTPATIENT
Start: 2021-01-01 | End: 2021-01-01 | Stop reason: HOSPADM

## 2021-01-01 RX ORDER — WARFARIN SODIUM 2.5 MG/1
2.5 TABLET ORAL
Status: COMPLETED | OUTPATIENT
Start: 2021-01-01 | End: 2021-01-01

## 2021-01-01 RX ORDER — LORAZEPAM 0.5 MG/1
0.5 TABLET ORAL 2 TIMES DAILY
Qty: 6 TABLET | Refills: 3 | Status: SHIPPED | OUTPATIENT
Start: 2021-01-01 | End: 2021-01-01

## 2021-01-01 RX ORDER — AMLODIPINE BESYLATE 2.5 MG/1
2.5 TABLET ORAL NIGHTLY
Status: DISCONTINUED | OUTPATIENT
Start: 2021-01-01 | End: 2021-01-01 | Stop reason: HOSPADM

## 2021-01-01 RX ORDER — WARFARIN SODIUM 2.5 MG/1
TABLET ORAL
Qty: 84 TABLET | Refills: 3 | Status: SHIPPED | OUTPATIENT
Start: 2021-01-01

## 2021-01-01 RX ORDER — AMLODIPINE BESYLATE 2.5 MG/1
TABLET ORAL
Qty: 90 TABLET | Refills: 1 | Status: SHIPPED | OUTPATIENT
Start: 2021-01-01 | End: 2021-01-01 | Stop reason: SDUPTHER

## 2021-01-01 RX ORDER — LORAZEPAM 0.5 MG/1
0.5 TABLET ORAL 2 TIMES DAILY PRN
Status: DISCONTINUED | OUTPATIENT
Start: 2021-01-01 | End: 2021-01-01 | Stop reason: HOSPADM

## 2021-01-01 RX ORDER — DEXTROSE MONOHYDRATE 50 MG/ML
INJECTION, SOLUTION INTRAVENOUS CONTINUOUS
Status: DISCONTINUED | OUTPATIENT
Start: 2021-01-01 | End: 2021-01-01 | Stop reason: HOSPADM

## 2021-01-01 RX ORDER — ONDANSETRON 2 MG/ML
4 INJECTION INTRAMUSCULAR; INTRAVENOUS EVERY 6 HOURS PRN
Status: DISCONTINUED | OUTPATIENT
Start: 2021-01-01 | End: 2021-01-01 | Stop reason: HOSPADM

## 2021-01-01 RX ORDER — ACETAMINOPHEN 650 MG/1
650 SUPPOSITORY RECTAL ONCE
Status: COMPLETED | OUTPATIENT
Start: 2021-01-01 | End: 2021-01-01

## 2021-01-01 RX ORDER — DIGOXIN 125 MCG
125 TABLET ORAL DAILY
Status: DISCONTINUED | OUTPATIENT
Start: 2021-01-01 | End: 2021-01-01 | Stop reason: HOSPADM

## 2021-01-01 RX ORDER — AMLODIPINE BESYLATE 2.5 MG/1
TABLET ORAL
Qty: 90 TABLET | Refills: 3 | Status: SHIPPED | OUTPATIENT
Start: 2021-01-01

## 2021-01-01 RX ORDER — ACETAMINOPHEN 325 MG/1
650 TABLET ORAL EVERY 6 HOURS PRN
Status: DISCONTINUED | OUTPATIENT
Start: 2021-01-01 | End: 2021-01-01 | Stop reason: HOSPADM

## 2021-01-01 RX ORDER — SODIUM CHLORIDE 0.9 % (FLUSH) 0.9 %
5-40 SYRINGE (ML) INJECTION EVERY 12 HOURS SCHEDULED
Status: DISCONTINUED | OUTPATIENT
Start: 2021-01-01 | End: 2021-01-01 | Stop reason: HOSPADM

## 2021-01-01 RX ORDER — PRAVASTATIN SODIUM 20 MG
TABLET ORAL
Qty: 90 TABLET | Refills: 1 | Status: SHIPPED | OUTPATIENT
Start: 2021-01-01 | End: 2021-01-01 | Stop reason: SDUPTHER

## 2021-01-01 RX ADMIN — REGADENOSON 0.4 MG: 0.08 INJECTION, SOLUTION INTRAVENOUS at 09:27

## 2021-01-01 RX ADMIN — Medication 10 ML: at 12:21

## 2021-01-01 RX ADMIN — Medication 10 ML: at 09:56

## 2021-01-01 RX ADMIN — AMLODIPINE BESYLATE 2.5 MG: 2.5 TABLET ORAL at 20:00

## 2021-01-01 RX ADMIN — AMLODIPINE BESYLATE 5 MG: 5 TABLET ORAL at 18:30

## 2021-01-01 RX ADMIN — LORAZEPAM 0.5 MG: 0.5 TABLET ORAL at 20:00

## 2021-01-01 RX ADMIN — PRAVASTATIN SODIUM 20 MG: 20 TABLET ORAL at 20:25

## 2021-01-01 RX ADMIN — WARFARIN SODIUM 3 MG: 1 TABLET ORAL at 18:34

## 2021-01-01 RX ADMIN — AMLODIPINE BESYLATE 5 MG: 5 TABLET ORAL at 09:28

## 2021-01-01 RX ADMIN — AMLODIPINE BESYLATE 5 MG: 5 TABLET ORAL at 12:21

## 2021-01-01 RX ADMIN — AMLODIPINE BESYLATE 2.5 MG: 2.5 TABLET ORAL at 20:34

## 2021-01-01 RX ADMIN — LORAZEPAM 0.5 MG: 0.5 TABLET ORAL at 20:25

## 2021-01-01 RX ADMIN — VANCOMYCIN HYDROCHLORIDE 1500 MG: 1 INJECTION, POWDER, LYOPHILIZED, FOR SOLUTION INTRAVENOUS at 07:15

## 2021-01-01 RX ADMIN — SODIUM CHLORIDE 2178 ML: 9 INJECTION, SOLUTION INTRAVENOUS at 06:31

## 2021-01-01 RX ADMIN — AMLODIPINE BESYLATE 5 MG: 5 TABLET ORAL at 09:56

## 2021-01-01 RX ADMIN — DEXTROSE MONOHYDRATE: 50 INJECTION, SOLUTION INTRAVENOUS at 07:12

## 2021-01-01 RX ADMIN — ACETAMINOPHEN 650 MG: 650 SUPPOSITORY RECTAL at 07:12

## 2021-01-01 RX ADMIN — ACETAMINOPHEN 650 MG: 325 TABLET ORAL at 18:29

## 2021-01-01 RX ADMIN — PANTOPRAZOLE SODIUM 40 MG: 40 TABLET, DELAYED RELEASE ORAL at 05:12

## 2021-01-01 RX ADMIN — DIGOXIN 125 MCG: 125 TABLET ORAL at 12:20

## 2021-01-01 RX ADMIN — PIPERACILLIN SODIUM AND TAZOBACTAM SODIUM 4500 MG: 4; .5 INJECTION, POWDER, LYOPHILIZED, FOR SOLUTION INTRAVENOUS at 06:27

## 2021-01-01 RX ADMIN — PRAVASTATIN SODIUM 20 MG: 20 TABLET ORAL at 20:34

## 2021-01-01 RX ADMIN — Medication 10 ML: at 20:35

## 2021-01-01 RX ADMIN — AMLODIPINE BESYLATE 2.5 MG: 2.5 TABLET ORAL at 20:25

## 2021-01-01 RX ADMIN — PANTOPRAZOLE SODIUM 40 MG: 40 TABLET, DELAYED RELEASE ORAL at 06:19

## 2021-01-01 RX ADMIN — PANTOPRAZOLE SODIUM 40 MG: 40 TABLET, DELAYED RELEASE ORAL at 09:29

## 2021-01-01 RX ADMIN — DIGOXIN 125 MCG: 125 TABLET ORAL at 09:56

## 2021-01-01 RX ADMIN — Medication 10 ML: at 20:00

## 2021-01-01 RX ADMIN — PRAVASTATIN SODIUM 20 MG: 20 TABLET ORAL at 20:00

## 2021-01-01 RX ADMIN — DIGOXIN 125 MCG: 125 TABLET ORAL at 09:28

## 2021-01-01 RX ADMIN — DIGOXIN 125 MCG: 125 TABLET ORAL at 18:29

## 2021-01-01 RX ADMIN — TETROFOSMIN 10.9 MILLICURIE: 1.38 INJECTION, POWDER, LYOPHILIZED, FOR SOLUTION INTRAVENOUS at 07:37

## 2021-01-01 RX ADMIN — WARFARIN SODIUM 2.5 MG: 2.5 TABLET ORAL at 18:17

## 2021-01-01 RX ADMIN — TETROFOSMIN 30.8 MILLICURIE: 1.38 INJECTION, POWDER, LYOPHILIZED, FOR SOLUTION INTRAVENOUS at 09:28

## 2021-01-01 ASSESSMENT — ENCOUNTER SYMPTOMS
EYES NEGATIVE: 1
ABDOMINAL PAIN: 0
RESPIRATORY NEGATIVE: 1
VOMITING: 0
GASTROINTESTINAL NEGATIVE: 1
ALLERGIC/IMMUNOLOGIC NEGATIVE: 1
BACK PAIN: 0
NAUSEA: 0
CHEST TIGHTNESS: 0
SORE THROAT: 0
COUGH: 0
SHORTNESS OF BREATH: 0
RHINORRHEA: 0
SHORTNESS OF BREATH: 0
COLOR CHANGE: 0

## 2021-01-01 ASSESSMENT — PAIN SCALES - GENERAL
PAINLEVEL_OUTOF10: 3
PAINLEVEL_OUTOF10: 3

## 2021-01-01 ASSESSMENT — PATIENT HEALTH QUESTIONNAIRE - PHQ9
SUM OF ALL RESPONSES TO PHQ9 QUESTIONS 1 & 2: 0
SUM OF ALL RESPONSES TO PHQ QUESTIONS 1-9: 0
SUM OF ALL RESPONSES TO PHQ QUESTIONS 1-9: 0

## 2021-01-25 NOTE — PROGRESS NOTES
Ms. Danielle Hart is here for management of anticoagulation for AFib. PMH also significant for HTN and HLD. She presents today w/out complaint. Pt verifies dosing regimen as listed above. Pt denies s/s bleeding/bruising/swelling/SOB. No BRBPR. No melena. Denies missed doses. Reviewed home medications. No changes in RX/OTCs/Herbal medications. Reviewed dietary concerns   Denies EToH and tobacco use. No changes per pt    Pt seen in office  97.5 deg F    INR 2.4 is within acceptable therapeutic range of 2-3  Recommend to continue with 2.5 mg daily except 3.75 mg on Mon  Patient has 2.5 mg tablets. Will continue to monitor and check INR in 6 weeks. Dosing reminder card given with phone number, appointment date and time.    Return to clinic:  3/8/21 @ 9:30 AM    Musa DwyerD 9:32 AM EST 1/25/21

## 2021-02-17 NOTE — TELEPHONE ENCOUNTER
Controlled Substance Monitoring:    Acute and Chronic Pain Monitoring:   RX Monitoring 2/17/2021   Attestation -   Periodic Controlled Substance Monitoring No signs of potential drug abuse or diversion identified.

## 2021-02-17 NOTE — TELEPHONE ENCOUNTER
Date of last refill of this med was 10/15, # of pills given 180 and # of refills given 0. Their next appointment is 3/22, the last date patient was seen was 10/15. Does patient have medication agreement on file? Yes  Has drug screen been done in last 12 months if needed?  no

## 2021-03-08 NOTE — PROGRESS NOTES
Ms. Voss Nicely is here for management of anticoagulation for AFib. PMH also significant for HTN and HLD. She presents today w/out complaint. Pt verifies dosing regimen as listed above. Pt denies s/s bleeding/bruising/swelling/SOB. No BRBPR. No melena. Denies missed doses. Reviewed home medications. No changes in RX/OTCs/Herbal medications. Reviewed dietary concerns   Denies EToH and tobacco use. No changes per pt    Pt seen in office    INR 2.7 is within acceptable therapeutic range of 2-3  Recommend to continue with 2.5 mg daily except 3.75 mg on Mon  Patient has 2.5 mg tablets. Will continue to monitor and check INR in 6 weeks. Dosing reminder card given with phone number, appointment date and time.    Return to clinic:  4/19/21 @ 9:30 AM    Maria A Petty PharmD 9:36 AM EST 3/8/21

## 2021-03-22 NOTE — PROGRESS NOTES
Subjective:      Patient ID: Orlando Way is a 78 y.o. female. Chief Complaint   Patient presents with    Check-Up    Atrial Fibrillation        HPI     Treatment Adherence:   Medication compliance:  compliant most of the time  Diet compliance:  compliant most of the time  Weight trend: stable  Current exercise: no regular exercise  Barriers: impairment:  physical    Hypertension:  Home blood pressure monitoring: Yes - 110-125 over 60-70. Patient denies chest pain, shortness of breath, headache, lightheadedness, blurred vision, palpitations, dry cough and fatigue however patient does have some mild peripheral edema. Antihypertensive medication side effects: no medication side effects noted. Use of agents associated with hypertension: none. Sodium (mmol/L)   Date Value   10/15/2020 142    BUN (mg/dL)   Date Value   10/15/2020 16    Glucose (mg/dL)   Date Value   10/15/2020 97      Potassium (mmol/L)   Date Value   10/15/2020 4.6    CREATININE (mg/dL)   Date Value   10/15/2020 0.8         Hyperlipidemia:  No new myalgias or GI upset on pravastatin (Pravachol). Lab Results   Component Value Date    CHOL 176 06/17/2020    TRIG 136 06/17/2020    HDL 44 06/17/2020    LDLCALC 105 (H) 06/17/2020     Lab Results   Component Value Date    ALT 16 10/15/2020    AST 18 10/15/2020         Orlando Way is a 78 y.o. female who presents for follow-up of atrial fibrillation. Onset was several  years ago, and patient reports symptoms have stabilized since that time. Recently, the patient has had no other symptoms. The patient denies abdominal pain, AICD firing, calf pain, chest pain, cough, dizziness, palpitations, rapid heart beat, shortness of breath, slow heart beat and syncope. The patient has a past history of atrial fibrillation and HTN.      Past Medical History:   Diagnosis Date    Adrenal mass ([address] 3/12    felt benign    Anxiety     Colon polyp     Tubular adenoma: TAC + 2/09, +2/11    Osteoarthritis 2/12    hip    Osteoporosis     alendronate treatment > 5 yrs    Routine health maintenance     +FH breast Cancer     Ureteral obstruction, right 3/12    ? chronic     Past Surgical History:   Procedure Laterality Date    CATARACT REMOVAL Bilateral May, 13    Dr Gonzalo Irving  3/11    large sessile polyp     Family History   Problem Relation Age of Onset    Cancer Mother 46        Breast    Deep Vein Thrombosis Father      Review of Systems   Constitutional: Negative. Negative for activity change, appetite change, chills, fever and unexpected weight change. HENT: Negative. Negative for sneezing. Eyes: Negative. Respiratory: Negative. Negative for cough, chest tightness and shortness of breath. Cardiovascular: Positive for leg swelling (mild interittent ). Negative for chest pain and palpitations. Gastrointestinal: Negative. Endocrine: Negative. Genitourinary: Negative. Skin: Negative. Allergic/Immunologic: Negative. Neurological: Negative for dizziness and headaches. Psychiatric/Behavioral: Negative. Patient Active Problem List   Diagnosis    Anxiety    Chronic midline low back pain without sciatica    Ex-smoker    Benign essential hypertension    Pure hypercholesterolemia    Intermittent atrial fibrillation (HCC)    Primary osteoarthritis of knees, bilateral    Late onset Alzheimer's disease without behavioral disturbance ([facility] [address]       No outpatient medications have been marked as taking for the 3/22/21 encounter (Appointment) with ROBERT Chan CNP.        Allergies   Allergen Reactions    Beta Adrenergic Blockers Other (See Comments)     Bradycardia       Social History     Tobacco Use    Smoking status: Former Smoker     Packs/day: 0.25     Years: 40.00     Pack years: 10.00     Quit date: 7/23/2017     Years since quitting: 3.6    Smokeless tobacco: Never Used    Tobacco comment: smokes 4 cigarettes a anamika has since 2007   Substance Use Topics    Alcohol use: No     Alcohol/week: 0.0 standard drinks       Objective:   /77   Pulse 77   Temp 98 °F (36.7 °C) (Oral)   Wt 176 lb 3.2 oz (79.9 kg)   SpO2 98%   BMI 27.60 kg/m²     Physical Exam  Vitals signs and nursing note reviewed. Constitutional:       General: She is not in acute distress. Appearance: Normal appearance. She is well-developed and well-groomed. HENT:      Head: Normocephalic and atraumatic. Eyes:      Extraocular Movements: Extraocular movements intact. Conjunctiva/sclera: Conjunctivae normal.   Neck:      Musculoskeletal: Normal range of motion and neck supple. Cardiovascular:      Rate and Rhythm: Normal rate and regular rhythm. Pulses: Normal pulses. Heart sounds: Normal heart sounds, S1 normal and S2 normal. No murmur. No friction rub. No gallop. Pulmonary:      Effort: Pulmonary effort is normal. No accessory muscle usage or respiratory distress. Breath sounds: Normal breath sounds. Abdominal:      General: Bowel sounds are normal.      Palpations: Abdomen is soft. Musculoskeletal:      Right lower leg: Edema (non pitting trace bilat edema ankles) present. Left lower leg: Edema present. Lymphadenopathy:      Cervical: No cervical adenopathy. Skin:     General: Skin is warm and dry. Capillary Refill: Capillary refill takes less than 2 seconds. Findings: No rash. Neurological:      General: No focal deficit present. Mental Status: She is alert and oriented to person, place, and time. Mental status is at baseline. Psychiatric:         Attention and Perception: Attention normal.         Mood and Affect: Mood normal.         Speech: Speech normal.         Behavior: Behavior normal. Behavior is cooperative. Assessment/Plan:   1. Anxiety  Patient presents today to follow-up on chronic conditions including anxiety, hypertension, hyperlipidemia and atrial fib. Patient reports she feels well and denies any acute problems or concerns. Patient reports anxiety is well controlled with current treatment. Reviewed [facility] report with no concerns noted. Refill provided. - LORazepam (ATIVAN) 0.5 MG tablet; Take 1 tablet by mouth 2 times daily for 90 days. Dispense: 180 tablet; Refill: 3    2. Benign essential hypertension  Daughter monitors blood pressure at home and blood pressure has been well controlled. Patient denies any chest pain, shortness of breath, headaches, lightheadedness, dizziness however does have some mild bilateral edema of ankles and feet. Patient reports greatest the right foot as she fractured this ankle many years ago. Recommend patient continue with current treatment. Order for labs as below. - CBC Auto Differential  - Comprehensive Metabolic Panel    3. Pure hypercholesterolemia  Patient continues to take pravastatin as ordered without any new myalgias or GI upset. Reviewed lipids from 6/17/2020 which indicate fair control. Recommend patient continue with current treatment. 4. Intermittent atrial fibrillation ([facility] [address]  Patient with a history of intermittent atrial fib. Patient is on Coumadin. INR 2.7 on 3/8/2021. Patient denies any signs or symptoms of bleeding and does not monitor for signs of bleeding. Repeat dig level today. Recommend patient continue with current treatment.  - DIGOXIN LEVEL    Controlled Substance Monitoring:    Acute and Chronic Pain Monitoring:   RX Monitoring 3/22/2021   Attestation -   Periodic Controlled Substance Monitoring No signs of potential drug abuse or diversion identified.

## 2021-03-22 NOTE — PATIENT INSTRUCTIONS
Please read the healthy family handout that you were given and share it with your family. Please compare this printed medication list with your medications at home to be sure they are the same. If you have any medications that are different please contact us immediately at [phone]. Also review your allergies that we have listed, these may also include medications that you have not been able to tolerate, make sure everything listed is correct. If you have any allergies that are different please contact us immediately at [phone]. Patient Education        Learning About Anxiety Disorders  What are anxiety disorders? Anxiety disorders are a type of medical problem. They cause severe anxiety. When you feel anxious, you feel that something bad is about to happen. This feeling interferes with your life. These disorders include:  · Generalized anxiety disorder. You feel worried and stressed about many everyday events and activities. This goes on for several months and disrupts your life on most days. · Panic disorder. You have repeated panic attacks. A panic attack is a sudden, intense fear or anxiety. It may make you feel short of breath. Your heart may pound. · Social anxiety disorder. You feel very anxious about what you will say or do in front of people. For example, you may be scared to talk or eat in public. This problem affects your daily life. · Phobias. You are very scared of a specific object, situation, or activity. For example, you may fear spiders, high places, or small spaces. What are the symptoms? Generalized anxiety disorder  Symptoms may include:  · Feeling worried and stressed about many things almost every day. · Feeling tired or irritable. You may have a hard time concentrating. · Having headaches or muscle aches. · Having a hard time getting to sleep or staying asleep. Panic disorder  You may have repeated panic attacks when there is no reason for feeling afraid.  You may change your daily activities because you worry that you will have another attack. Symptoms may include:  · Intense fear, terror, or anxiety. · Trouble breathing or very fast breathing. · Chest pain or tightness. · A heartbeat that races or is not regular. Social anxiety disorder  Symptoms may include:  · Fear about a social situation, such as eating in front of others or speaking in public. You may worry a lot. Or you may be afraid that something bad will happen. · Anxiety that can cause you to blush, sweat, and feel shaky. · A heartbeat that is faster than normal.  · A hard time focusing. Phobias  Symptoms may include:  · More fear than most people of being around an object, being in a situation, or doing an activity. You might also be stressed about the chance of being around the thing you fear. · Worry about losing control, panicking, fainting, or having physical symptoms like a faster heartbeat when you are around the situation or object. How are these disorders treated? Anxiety disorders can be treated with medicines or counseling. A combination of both may be used. Medicines may include:  · Antidepressants. These may help your symptoms by keeping chemicals in your brain in balance. · Benzodiazepines. These may give you short-term relief of your symptoms. Some people use cognitive-behavioral therapy. A therapist helps you learn to change stressful or bad thoughts into helpful thoughts. Lead a healthy lifestyle  A healthy lifestyle may help you feel better. · Get at least 30 minutes of exercise on most days of the week. Walking is a good choice. · Eat a healthy diet. Include fruits, vegetables, lean proteins, and whole grains in your diet each day. · Try to go to bed at the same time every night. Try for 8 hours of sleep a night. · Find ways to manage stress. Try relaxation exercises. · Avoid alcohol and illegal drugs. Follow-up care is a key part of your treatment and safety.  Be sure to make and go to all appointments, and call your doctor if you are having problems. It's also a good idea to know your test results and keep a list of the medicines you take. Where can you learn more? Go to https://santiago.TheTake. org and sign in to your [employer] account. Enter L979 in the [facility] box to learn more about \"Learning About Anxiety Disorders. \"     If you do not have an account, please click on the \"Sign Up Now\" link. Current as of: September 23, 2020               Content Version: 12.8  © 2006-2021 [employer]. Care instructions adapted under license by [facility] ([facility]). If you have questions about a medical condition or this instruction, always ask your healthcare professional. [address] any warranty or liability for your use of this information. Patient Education        warfarin (oral)  Pronunciation:  WAR far in  Brand:  CoumadinTheo  What is the most important information I should know about warfarin? You should not take warfarin if you are prone to bleeding because of a medical condition, if you have an upcoming surgery, or if you need a spinal tap or epidural. Do not take warfarin if you cannot take it on time every day. Warfarin increases your risk of severe or fatal bleeding, especially if you have certain medical conditions, if you are 72 or older, or if you have had a stroke, or bleeding in your stomach or intestines. Seek emergency help if you have any bleeding that will not stop. Call your doctor at once if you have other signs of bleeding such as: swelling, pain, feeling very weak or dizzy, unusual bruising, bleeding gums, nosebleeds, heavy menstrual periods or abnormal vaginal bleeding, blood in your urine, bloody or tarry stools, coughing up blood or vomit that looks like coffee grounds. Many other drugs can increase your risk of bleeding when used with warfarin.  Tell your doctor about all medicines you have recently used. Avoid making any changes in your diet without first talking to your doctor. Some foods can make warfarin less effective. What is warfarin? Warfarin is an anticoagulant (blood thinner). Warfarin reduces the formation of blood clots. Warfarin is used to treat or prevent blood clots in veins or arteries, which can reduce the risk of stroke, heart attack, or other serious conditions. Warfarin may also be used for purposes not listed in this medication guide. What should I discuss with my healthcare provider before taking warfarin? You should not take warfarin if you are allergic to it, or if:  · you have very high blood pressure;  · you recently had or will have surgery on your brain, spine, or eye;  · you undergo a spinal tap or spinal anesthesia (epidural); or  · you cannot take warfarin on time every day. You also should not take warfarin if you are are prone to bleeding because of a medical condition, such as:   · a blood cell disorder (such as low red blood cells or low platelets);  · ulcers or bleeding in your stomach, intestines, lungs, or urinary tract;  · an aneurysm or bleeding in the brain; or  · an infection of the lining of your heart. Do not take warfarin if you are pregnant, unless your doctor tells you to. Warfarin can cause birth defects, but preventing blood clots may outweigh any risks to the baby. If you are not pregnant, use effective birth control to prevent pregnancy while taking warfarin and for at least 1 month after your last dose. Tell your doctor right away if you become pregnant. Warfarin can make you bleed more easily, especially if you have ever had:   · high blood pressure or serious heart disease;  · kidney disease;  · cancer or low blood cell counts;  · an accident or surgery;  · bleeding in your stomach or intestines;  · a stroke; or  · if you are 72 or older.   To make sure warfarin is safe for you, tell your doctor if you have ever had:  · diabetes;  · congestive heart failure;  · liver disease, kidney disease (or if you are on dialysis);  · a hereditary clotting deficiency; or  · low blood platelets after receiving heparin. It is not known whether warfarin passes into breast milk. Watch for signs of bruising or bleeding in the baby if you take warfarin while you are breast-feeding a baby. How should I take warfarin? Follow all directions on your prescription label. Your doctor may occasionally change your dose. Do not take warfarin in larger or smaller amounts or for longer than your doctor tells you to. Take warfarin at the same time every day, with or without food. Never take a double dose. Warfarin can make it easier for you to bleed. Seek emergency help if you have any bleeding that will not stop. You will need frequent \"INR\" or prothrombin time tests (to measure your blood-clotting time and determine your warfarin dose). You must remain under the care of a doctor while taking warfarin. If you receive warfarin in a hospital, call or visit your doctor 3 to 7 days after you leave the hospital. Your INR will need to be tested at that time. Do not miss any follow-up appointments. Tell your doctor if you are sick with diarrhea, fever, chills, or flu symptoms, or if your body weight changes. You may need to stop taking warfarin 5 to 7 days before having any surgery, dental work, or a medical procedure. Call your doctor for instructions. Wear a medical alert tag or carry an ID card stating that you take warfarin. Any medical care provider who treats you should know that you are taking this medicine. Store at room temperature away from heat, moisture, and light. What happens if I miss a dose? Take the missed dose as soon as you remember. Skip the missed dose if it is almost time for your next scheduled dose. Do not take extra medicine to make up the missed dose. What happens if I overdose?   Seek emergency medical attention or call the Poison Help line at 1-867.217.4397. An overdose can cause excessive bleeding. What should I avoid while taking warfarin? Avoid activities that may increase your risk of bleeding or injury. Use extra care to prevent bleeding while shaving or brushing your teeth. You may still bleed more easily for several days after you stop taking warfarin. Avoid making any changes in your diet without first talking to your doctor. Foods that are high in vitamin K (liver, leafy green vegetables, or vegetable oils) can make warfarin less effective. If these foods are part of your diet, eat a consistent amount on a weekly basis. Grapefruit juice, cranberry juice, stevie juice, and pomegranate juice may interact with warfarin and lead to unwanted side effects. Avoid the use of these juice products while taking warfarin. Avoid drinking alcohol. Ask your doctor before using any medicine for pain, arthritis, fever, or swelling. This includes aspirin, ibuprofen (Advil, Motrin), naproxen (Aleve), celecoxib (Celebrex), diclofenac, indomethacin, meloxicam, and others. These medicines may affect blood clotting and may also increase your risk of stomach bleeding. What are the possible side effects of warfarin? Get emergency medical help if you have signs of an allergic reaction: hives; difficult breathing; swelling of your face, lips, tongue, or throat. Warfarin increases your risk of bleeding, which can be severe or life-threatening. Call your doctor at once if you have any signs of bleeding such as:  · sudden headache, feeling very weak or dizzy;  · swelling, pain, unusual bruising;  · bleeding gums, nosebleeds;  · bleeding from wounds or needle injections that will not stop;  · heavy menstrual periods or abnormal vaginal bleeding;  · blood in your urine, bloody or tarry stools; or  · coughing up blood or vomit that looks like coffee grounds.   Clots formed by warfarin may block normal blood flow, which could lead to tissue death or amputation of the affected body part. Get medical help at once if you have:  · pain, swelling, hot or cold feeling, skin changes, or discoloration anywhere on your body; or  · sudden and severe leg or foot pain, foot ulcer, purple toes or fingers. Bleeding is the most common side effect of warfarin. This is not a complete list of side effects and others may occur. Call your doctor for medical advice about side effects. You may report side effects to FDA at 4-644-FDA-9427. What other drugs will affect warfarin? Many drugs (including some over-the-counter medicines and herbal products) can affect your INR and may increase the risk of bleeding if you take them with warfarin. Not all possible drug interactions are listed in this medication guide. It is very important to ask your doctor and pharmacist before you start or stop using any other medicine, especially:  · other medicines to prevent blood clots;  · an antibiotic or antifungal medicine;  · supplements that contain vitamin K; or  · herbal (botanical) products --coenzyme Q10, cranberry, echinacea, garlic, ginkgo biloba, ginseng, goldenseal, or Connie's wort. This list is not complete and many other drugs can interact with warfarin. This includes prescription and over-the-counter medicines, vitamins, and herbal products. Give a list of all your medicines to any healthcare provider who treats you. Where can I get more information? Your pharmacist can provide more information about warfarin. Remember, keep this and all other medicines out of the reach of children, never share your medicines with others, and use this medication only for the indication prescribed. Every effort has been made to ensure that the information provided by [address] is accurate, up-to-date, and complete, but no guarantee is made to that effect. Drug information contained herein may be time sensitive.  [employer] information has been compiled for use by healthcare practitioners and consumers in the [place] and therefore [employer] does not warrant that uses outside of the [place] are appropriate, unless specifically indicated otherwise. [employer] drug information does not endorse drugs, diagnose patients or recommend therapy. [employer] drug information is an informational resource designed to assist licensed healthcare practitioners in caring for their patients and/or to serve consumers viewing this service as a supplement to, and not a substitute for, the expertise, skill, knowledge and judgment of healthcare practitioners. The absence of a warning for a given drug or drug combination in no way should be construed to indicate that the drug or drug combination is safe, effective or appropriate for any given patient. [facility] does not assume any responsibility for any aspect of healthcare administered with the aid of information [facility] provides. The information contained herein is not intended to cover all possible uses, directions, precautions, warnings, drug interactions, allergic reactions, or adverse effects. If you have questions about the drugs you are taking, check with your doctor, nurse or pharmacist.  Copyright 3143-8526 [address]: 22.01. Revision date: 9/14/2017. Care instructions adapted under license by [facility] ([facility]). If you have questions about a medical condition or this instruction, always ask your healthcare professional. [place] [address] any warranty or liability for your use of this information. Patient Education        Taking Warfarin Safely: Care Instructions  Your Care Instructions     Warfarin is a medicine that you take to prevent blood clots. It is often called a blood thinner. Doctors give warfarin (such as Coumadin) to reduce the risk of blood clots. You may be at risk for blood clots if you have atrial fibrillation or deep vein thrombosis. Some other health problems may also put you at risk.   Warfarin slows the amount of time it takes for your blood to clot. It can cause bleeding problems. Even if you've been taking warfarin for a while, it's important to know how to take it safely. Foods and other medicines can affect the way warfarin works. Some can make warfarin work too well. This can cause bleeding problems. And some can make it work poorly, so that it does not prevent blood clots very well. You will need regular blood tests to check how long it takes for your blood to form a clot. This test is called a PT or prothrombin time test. The result of the test is called an INR level. Depending on the test results, your doctor or anticoagulation clinic may adjust your dose of warfarin. Follow-up care is a key part of your treatment and safety. Be sure to make and go to all appointments, and call your doctor if you are having problems. It's also a good idea to know your test results and keep a list of the medicines you take. How can you care for yourself at home? Take warfarin safely  · Take your warfarin at the same time each day. · If you miss a dose of warfarin, don't take an extra dose to make up for it. Your doctor can tell you exactly what to do so you don't take too much or too little. · Wear medical alert jewelry that lets others know that you take warfarin. You can buy this at most drugstores. · Don't take warfarin if you are pregnant or planning to get pregnant. Talk to your doctor about how you can prevent getting pregnant while you are taking it. · Don't change your dose or stop taking warfarin unless your doctor tells you to. Effects of medicines and food on warfarin  · Don't start or stop taking any medicines, vitamins, or natural remedies unless you first talk to your doctor. Many medicines can affect how warfarin works. These include aspirin and other pain relievers, over-the-counter medicines, multivitamins, dietary supplements, and herbal products.   · Tell all of your doctors and pharmacists that you take warfarin. Some prescription medicines can affect how warfarin works. · Keep the amount of vitamin K in your diet about the same from day to day. Do not suddenly eat a lot more or a lot less food that is rich in vitamin K than you usually do. Vitamin K affects how warfarin works and how your blood clots. Talk with your doctor before making big changes in your diet. Foods that have a lot of vitamin K include cooked green vegetables, such as:  ? Kale, spinach, turnip greens, tasha greens, Swiss chard, and mustard greens. ? [place] sprouts, broccoli, and cabbage. · Limit your use of alcohol. Avoid bleeding by preventing falls and injuries  · Wear slippers or shoes with nonskid soles. · Remove throw rugs and clutter. · Rearrange furniture and electrical cords to keep them out of walking paths. · Keep stairways, porches, and outside walkways well lit. Use night-lights in hallways and bathrooms. · Be extra careful when you work with sharp tools or knives. When should you call for help? Call 911 anytime you think you may need emergency care. For example, call if:    · You have a sudden, severe headache that is different from past headaches. Call your doctor now or seek immediate medical care if:    · You have any abnormal bleeding, such as:  ? Nosebleeds. ? Vaginal bleeding that is different (heavier, more frequent, at a different time of the month) than what you are used to.  ? Bloody or black stools, or rectal bleeding. ? Bloody or pink urine. Watch closely for changes in your health, and be sure to contact your doctor if you have any problems. Where can you learn more? Go to [email or web address] and sign in to your Guest of a Guest account. Enter B251 in the [employer] box to learn more about \"Taking Warfarin Safely: Care Instructions. \"     If you do not have an account, please click on the \"Sign Up Now\" link.   Current as of: August 31, 2020               Content Version: 12.8  © 2006-2021 [employer]wise[employer]. Care instructions adapted under license by [facility] ([facility]). If you have questions about a medical condition or this instruction, always ask your healthcare professional. [address] any warranty or liability for your use of this information.

## 2021-06-01 NOTE — TELEPHONE ENCOUNTER
Jesus Bryan called and is asking if you would call her back regarding Herminia Milner. She fell yesterday because she is having problems walking and it is at the point of not being able to take care of her at home.

## 2021-06-01 NOTE — TELEPHONE ENCOUNTER
Returned call per patients daughters request.  Charlie An states her mom is very confused and her father is no longer able to care for her. She states patient had a fall yesterday and did bruise her hand. Daughter states patient walked fine yesterday with use of her walker after the fall however today is refusing to walk. She also reports over the past one month patient has times she does not recognize family members and has had poor personal hygiene. Daughter states she works full-time and there is not one to care for her any longer. Recommend they take patient to hospital for evaluation. Daughter agreeable.

## 2021-06-02 PROBLEM — R53.1 WEAKNESS: Status: ACTIVE | Noted: 2021-01-01

## 2021-06-02 NOTE — CARE COORDINATION
Case Management Assessment  Initial Evaluation      Patient Name: Jolynn Lang  YOB: 1940  Diagnosis: Weakness [R53.1]  Date / Time: 6/2/2021  9:12 AM    Admission status/Date: 06/02/2021 Inpatient  Chart Reviewed: Yes      Patient Briana Cody: No -pt in the ED and has a covid pending  Family Interviewed:  Yes - pt's daughter Wendy Courser at [phone]    Hospitalization in the last 30 days:  No      Health Care Decision Maker :  Pt's daughter Wendy Courser stated that pt has a POA. Requested copies and she stated she would provide copies to the RN on the floor. Met with: pt's daughter Sang Damon conducted  (bedside/phone): phone    Current PCP: William JONES-CNP    Financial  [place] Medicare  Precert required for SNF : Y          3 night stay required -  N    ADLS  Support Systems/Care Needs:  family  Transportation: family    Meal Preparation: caregivers    Housing  Living Arrangements: pt lives at home with her .  Her daughter lives 2 minutes down the road  Steps: Ramp  Intent for return to present living arrangements: TBD; pt's daughter feels pt needs SNF  Identified Issues: n/a    Home Care Information  Active with [address] : No Agency:(Services)     Passport/Waiver : No  :                      Phone Number:    Passport/Waiver Services: n/a          Durable Medical Equiptment   DME Provider: n/a  Equipment:   Walker__x_Cane___RTS___ BSC___Shower Chair___Hospital Bed___W/C__x__Other________  02 at ____Liter(s)---wears(frequency)_______ HHN ___ CPAP___ BiPap___   N/A____      Home O2 Use :  No    If No for home O2---if presently on O2 during hospitalization:  No  if yes CM to follow for potential DC O2 need  Informed of need for care provider to bring portable home O2 tank on day of discharge for nursing to connect prior to leaving:   Not Indicated  Verbalized agreement/Understanding:   Not Indicated    Community Service Affiliation  Dialysis:  No · Agency:  · Location:  · Dialysis Schedule:  · Phone:   · Fax: Other Community Services: Private duty care takers that assist with meals and shopping. They are there Monday through Saturday per pt's daughter    DISCHARGE PLAN: Explained Case Management role/services. Chart review completed. Pt is in the ED awaiting a bed and has a covid pending. Pt's  Maura Lowe did not answer the phone and no message left as it was a generic voicemail. Called and spoke with pt's daughter Tana who completed the assessment. She stated pt has been requiring assistance with her ADL's lately. She feels pt may need SNF. She is aware pt will need to be agreeable and we will need insurance authorization pending PT/OT recommendations which she stated agreement/understanding. She denied needs or questions for CM. CM will follow. Please notify CM if needs or concerns arise.

## 2021-06-02 NOTE — ED PROVIDER NOTES
Alex 48        Pt Name: Reginaldo Holguin  MRN: [id number]  [place] 1940  Date of evaluation: 6/2/2021  Provider: ROBERT Sebastian CNP  PCP: ROBERT Junior CNP  ED Attending: Adamaris Holland MD    CHIEF COMPLAINT       Chief Complaint   Patient presents with    Fall     patient fell at home on monday, family reports she did not hit head no LOC, bruising to left hand, hx demntia, increased weakness/confusion per family at this time. PCP recommended coming in for possible admission for rehab       HISTORY OF PRESENT ILLNESS   (Location/Symptom, Timing/Onset, Context/Setting, Quality, Duration, Modifying Factors, Severity)  Note limiting factors. Reginaldo Holguin is a 78 y.o. female for mechanical fall. Onset was 2 days ago. Context includes family reports pt was turning without her cane and fall. She did not hit her head but has been having increased confusion for the past few weeks. Family states she is no longer able to get up from a seated position due to general weakness. Pt reports she has been having left sided leg pain as well. Alleviating factors include nothing. Aggravating factors include nothing. Pain is 0/10. nothing has been used for pain today. Nursing Notes were all reviewed and agreed with or any disagreements were addressed  in the HPI. REVIEW OF SYSTEMS  (2-9 systems for level 4, 10 or more for level 5)     Review of Systems   Constitutional: Negative for fever. Mechanical fall   HENT: Negative for congestion, rhinorrhea and sore throat. Respiratory: Negative for shortness of breath. Cardiovascular: Negative for chest pain. Gastrointestinal: Negative for abdominal pain, nausea and vomiting. Genitourinary: Negative for decreased urine volume and difficulty urinating. Musculoskeletal: Negative for arthralgias, back pain, myalgias and neck pain.         Left leg pain   Skin: Negative for color change and rash. Neurological: Negative for dizziness and light-headedness. Psychiatric/Behavioral: Negative for agitation. All other systems reviewed and are negative. Positivesand Pertinent negatives as per HPI. Except as noted above in the ROS, all other systems were reviewed and negative. PAST MEDICAL HISTORY     Past Medical History:   Diagnosis Date    A-fib [facility])     Adrenal mass ([address] 3/12    felt benign    Anxiety     Colon polyp     Tubular adenoma: TAC + 2/09, +2/11    Osteoarthritis 2/12    hip    Osteoporosis     alendronate treatment > 5 yrs    Routine health maintenance     +FH breast Cancer     Ureteral obstruction, right 3/12    ? chronic         SURGICAL HISTORY       Past Surgical History:   Procedure Laterality Date    CATARACT REMOVAL Bilateral May, 13    Dr Luis Enrique Sepulveda  3/11    large sessile polyp    VARICOSE VEIN SURGERY           CURRENT MEDICATIONS       Current Discharge Medication List      CONTINUE these medications which have NOT CHANGED    Details   !! amLODIPine (NORVASC) 2.5 MG tablet daily  Qty: 90 tablet, Refills: 3      pravastatin (PRAVACHOL) 20 MG tablet daily  Qty: 90 tablet, Refills: 3      LORazepam (ATIVAN) 0.5 MG tablet Take 1 tablet by mouth 2 times daily for 90 days.   Qty: 180 tablet, Refills: 3    Comments: Not to exceed 5 additional fills before 04/13/2021  Associated Diagnoses: Anxiety      pantoprazole (PROTONIX) 40 MG tablet TAKE 1 TABLET BY MOUTH EVERY DAY BEFORE BREAKFAST  Qty: 90 tablet, Refills: 3      warfarin (COUMADIN) 2.5 MG tablet TAKE 1 TAB DAILY EXCEPT 1/2 TAB ON SUNDAY  Qty: 84 tablet, Refills: 3      !! amLODIPine (NORVASC) 5 MG tablet TAKE 1 TABLET BY MOUTH DAILY  Qty: 90 tablet, Refills: 3      digoxin (LANOXIN) 125 MCG tablet TAKE 1 TABLET BY MOUTH EVERY DAY  Qty: 90 tablet, Refills: 3      acetaminophen (TYLENOL) 500 MG tablet Take 1-2 tablets by mouth 3 times daily as needed for Pain  Qty: 1 tablet, Refills: 0 !! - Potential duplicate medications found. Please discuss with provider. ALLERGIES     Beta adrenergic blockers    FAMILY HISTORY       Family History   Problem Relation Age of Onset    Cancer Mother 46        Breast    Deep Vein Thrombosis Father          SOCIAL HISTORY       Social History     Socioeconomic History    Marital status:      Spouse name: Not on file    Number of children: Not on file    Years of education: Not on file    Highest education level: Not on file   Occupational History    Not on file   Tobacco Use    Smoking status: Former Smoker     Packs/day: 0.25     Years: 40.00     Pack years: 10.00     Quit date: 7/23/2017     Years since quitting: 3.8    Smokeless tobacco: Never Used    Tobacco comment: smokes 4 cigarettes a dayand has since 2007   Substance and Sexual Activity    Alcohol use: No     Alcohol/week: 0.0 standard drinks    Drug use: No    Sexual activity: Never   Other Topics Concern    Not on file   Social History Narrative    Not on file     Social Determinants of Health     Financial Resource Strain:     Difficulty of Paying Living Expenses:    Food Insecurity:     Worried About Running Out of Food in the Last Year:     [address] in the Last Year:    Transportation Needs:     Lack of Transportation (Medical):      Lack of Transportation (Non-Medical):    Physical Activity:     Days of Exercise per Week:     Minutes of Exercise per Session:    Stress:     Feeling of Stress :    Social Connections:     Frequency of Communication with Friends and Family:     Frequency of Social Gatherings with Friends and Family:     Attends [employer] Services:     Active Member of Clubs or Organizations:     Attends Club or Organization Meetings:     Marital Status:    Intimate Partner Violence:     Fear of Current or Ex-Partner:     Emotionally Abused:     Physically Abused:     Sexually Abused:        SCREENINGS   NIH Stroke DIAGNOSTIC RESULTS   LABS:    Labs Reviewed   CBC WITH AUTO DIFFERENTIAL - Abnormal; Notable for the following components:       Result Value    WBC 11.9 (*)     Neutrophils Absolute 8.2 (*)     All other components within normal limits    Narrative:     Performed at:  [facility],  [employer]   Phone [phone]   COMPREHENSIVE METABOLIC PANEL W/ REFLEX TO MG FOR LOW K - Abnormal; Notable for the following components:    Glucose 104 (*)     GFR Non- 53 (*)     All other components within normal limits    Narrative:     Performed at:  [facility],  [employer]   Phone [phone]   URINE RT REFLEX TO CULTURE - Abnormal; Notable for the following components:    Leukocyte Esterase, Urine TRACE (*)     All other components within normal limits    Narrative:     Performed at:  [facility],  [employer]   Phone [phone]   MICROSCOPIC URINALYSIS - Abnormal; Notable for the following components:    Mucus, UA Rare (*)     All other components within normal limits    Narrative:     Performed at:  [facility]) [facility],  [email or web address], [employer]   Phone [phone] - Abnormal; Notable for the following components:    Protime 26.0 (*)     INR 2.22 (*)     All other components within normal limits    Narrative:     Performed at:  [facility],  [email or web address], [employer]   Phone [phone], RAPID    Narrative:     Performed at:  [facility],  [employer]   Phone [phone]   BRAIN NATRIURETIC PEPTIDE    Narrative:     Performed at:  [facility],  [employer]   Phone [phone] TROPONIN    Narrative:     Performed at:  [facility],  [place], [facility]   Phone [phone]   TROPONIN    Narrative:     Performed at:  [facility] - [facility],  [place], [facility]   Phone [phone]   TROPONIN    Narrative:     Performed at:  [facility]) - [facility],  [place], [facility]   Phone [phone]   PROTIME-INR   BASIC METABOLIC PANEL W/ REFLEX TO MG FOR LOW K   CBC       All other labs were within normal range or not returned as of this dictation. EKG: All EKG's are interpreted by the Emergency Department Physician who either signs or Co-signs this chart in the absence of a cardiologist.  Please see their note for interpretation of EKG. RADIOLOGY:     Pelvic x-ray interpreted by radiologist for  FINDINGS:   Osteopenia.  Degenerative changes seen in the hips bilaterally.  No fracture. No destructive bony abnormality. Head CT without contrast interpreted by radiologist for    FINDINGS:   BRAIN/VENTRICLES: There is no acute intracranial hemorrhage, mass effect or   midline shift. No abnormal extra-axial fluid collection.  The gray-white   differentiation is maintained without evidence of an acute infarct. There is   prominence of the ventricles and sulci due to global parenchymal volume loss. There are nonspecific areas of hypoattenuation within the periventricular and   subcortical white matter, which likely represent chronic microvascular   ischemic change. ORBITS: The visualized portion of the orbits demonstrate no acute abnormality. SINUSES: The visualized paranasal sinuses and mastoid air cells demonstrate   no acute abnormality. SOFT TISSUES/SKULL: No acute abnormality of the visualized skull or soft   tissues. Left knee x-ray interpreted by radiologist for  Impression:    1. Joint effusion   2.  Tricompartmental osteoarthritic changes   3. No acute bony abnormality         Left hand x-ray interpreted by radiologist for no acute osseous abnormality. Portable chest x-ray interpreted by radiologist for lungs are clear mediastinum and cardiac silhouettes are unremarkable. Interpretation per the Radiologist below, if available at the time of this note:    XR PELVIS (1-2 VIEWS)   Final Result   Bilateral hip degenerative changes         CT HEAD WO CONTRAST   Final Result   No acute intracranial abnormality. XR KNEE LEFT (3 VIEWS)   Final Result   1. Joint effusion   2. Tricompartmental osteoarthritic changes   3. No acute bony abnormality         XR HAND LEFT (MIN 3 VIEWS)   Final Result   No acute osseous abnormality. XR CHEST PORTABLE   Final Result   No acute abnormality. XR PELVIS (1-2 VIEWS)    Result Date: 6/2/2021  EXAMINATION: ONE XRAY VIEW OF THE PELVIS 6/2/2021 10:48 am COMPARISON: None. HISTORY: ORDERING SYSTEM PROVIDED HISTORY: pain TECHNOLOGIST PROVIDED HISTORY: Reason for exam:->pain FINDINGS: Osteopenia. Degenerative changes seen in the hips bilaterally. No fracture. No destructive bony abnormality. Bilateral hip degenerative changes     XR HAND LEFT (MIN 3 VIEWS)    Result Date: 6/2/2021  EXAMINATION: THREE XRAY VIEWS OF THE LEFT HAND 6/2/2021 10:48 am COMPARISON: None. HISTORY: ORDERING SYSTEM PROVIDED HISTORY: fall TECHNOLOGIST PROVIDED HISTORY: Reason for exam:->fall Reason for Exam: pt fell about a week ago Acuity: Acute Type of Exam: Initial FINDINGS: There is no evidence of fracture, malalignment, or other acute osseous abnormality. There are degenerative changes in the trapezial metacarpal joint. No acute osseous abnormality. XR KNEE LEFT (3 VIEWS)    Result Date: 6/2/2021  EXAMINATION: THREE XRAY VIEWS OF THE LEFT KNEE 6/2/2021 10:48 am COMPARISON: None.  HISTORY: ORDERING SYSTEM PROVIDED HISTORY: pain TECHNOLOGIST PROVIDED HISTORY: Reason for exam:->pain Reason for Exam: pt fell a week ago Acuity: Acute Type of Exam: Initial FINDINGS: Alignment is anatomic. No fractures or destructive bony abnormalities are seen. Tricompartmental osteoarthritic changes are noted. Joint effusion. 1. Joint effusion 2. Tricompartmental osteoarthritic changes 3. No acute bony abnormality     CT HEAD WO CONTRAST    Result Date: 6/2/2021  EXAMINATION: CT OF THE HEAD WITHOUT CONTRAST  6/2/2021 11:01 am TECHNIQUE: CT of the head was performed without the administration of intravenous contrast. Dose modulation, iterative reconstruction, and/or weight based adjustment of the mA/kV was utilized to reduce the radiation dose to as low as reasonably achievable. COMPARISON: None. HISTORY: ORDERING SYSTEM PROVIDED HISTORY: general weakness TECHNOLOGIST PROVIDED HISTORY: If patient is on cardiac monitor and/or pulse ox, they may be taken off cardiac monitor and pulse ox, left on O2 if currently on. All monitors reattached when patient returns to room. Has a \"code stroke\" or \"stroke alert\" been called? ->No Reason for exam:->general weakness Decision Support Exception - unselect if not a suspected or confirmed emergency medical condition->Emergency Medical Condition (MA) Reason for Exam: Fall (patient fell at home on monday, family reports she did not hit head no LOC, bruising to left hand, hx demntia, increased weakness/confusion per family at this time. PCP recommended coming in for possible admission for rehab) FINDINGS: BRAIN/VENTRICLES: There is no acute intracranial hemorrhage, mass effect or midline shift. No abnormal extra-axial fluid collection. The gray-white differentiation is maintained without evidence of an acute infarct. There is prominence of the ventricles and sulci due to global parenchymal volume loss. There are nonspecific areas of hypoattenuation within the periventricular and subcortical white matter, which likely represent chronic microvascular ischemic change.  ORBITS: The visualized ondansetron (ZOFRAN-ODT) disintegrating tablet 4 mg (has no administration in time range)     Or   ondansetron (ZOFRAN) injection 4 mg (has no administration in time range)   polyethylene glycol (GLYCOLAX) packet 17 g (has no administration in time range)   acetaminophen (TYLENOL) tablet 650 mg (650 mg Oral Given 6/2/21 1829)     Or   acetaminophen (TYLENOL) suppository 650 mg ( Rectal See Alternative 6/2/21 1829)   amLODIPine (NORVASC) tablet 2.5 mg (has no administration in time range)         Patient was seen and evaluated by myself and Dr. Leanna Hale. Patient here today for mechanical fall. Patient states that she was using her cane on Monday and turned and fell. Family reports that over the last few week she has had increased confusion. They did report that she has been told by her primary care doctor she most likely has dementia. She denies any fevers nausea vomiting or diarrhea. She denies any chest pain or shortness of breath. Family reports that she has had decreased oral intake and the patient states that when she urinates she feels as if she is unable to empty all the way. Lab values have all been reviewed and interpreted. Patient did not have any concerns for UTI. Chest x-ray was negative. CT and images of her hand and the and pelvis all revealed degenerative joint disease but no fractures. It was discussed with the patient possibility of placement and she was in agreement with this plan. At this point the patient will be admitted to the hospitalist service for generalized weakness. Patient and family were informed of the plan and were in agreement with the plan. Consult was placed to the hospitalist who is accepted the patient. Patient's care was transferred upstairs. The patient tolerated their visit well. They were seen and evaluated by the attending physician, Genet Sommer MD who agreed with the assessment and plan.   The patient and / or the family were informed of the results of any tests, a time was given to answer questions, a plan was proposed and they agreed with plan. FINAL IMPRESSION      1. General weakness    2. Fall, initial encounter    3. Contusion of left hip, initial encounter    4. Osteoarthritis, unspecified osteoarthritis type, unspecified site          DISPOSITION/PLAN   DISPOSITION Admitted 06/02/2021 02:48:38 PM      PATIENT REFERRED TO:  No follow-up provider specified.     DISCHARGE MEDICATIONS:  Current Discharge Medication List          DISCONTINUED MEDICATIONS:  Current Discharge Medication List                 (Please note that portions of this note were completed with a voice recognition program.  Efforts were made to edit the dictations but occasionally words are mis-transcribed.)    ROBERT Florez CNP (electronically signed)       ROBERT Florez CNP  06/02/21 1949

## 2021-06-02 NOTE — ED PROVIDER NOTES
I independently examined and evaluated Cas Joe. In brief, Cas Joe is a 78 y.o. female with a past medical history of hypertension, paroxysmal atrial fibrillation, and Alzheimer's who presents to the ED complaining of fall. Patient presents for a fall 2 days ago. She was trying to ambulate without her cane and fell. She did not hit her head at that time, but family does report increased confusion over the past few weeks and general weakness. Patient is unable to care for herself and get up from a seated position due to generalized weakness. She also reports some chronic left leg pain. No palliative or provocative factors. Family is concerned about her being able to care for herself. She denies any fever, chest pain, shortness of breath, dysuria, vomiting or diarrhea, abdominal pain or other symptoms. She did not lose consciousness. She is on Coumadin. Focused exam revealed   PHYSICAL EXAM  BP (!) 156/61   Pulse 64   Temp 97.1 °F (36.2 °C) (Tympanic)   Resp 18   Ht 5' 8\" (1.727 m)   Wt 157 lb (71.2 kg)   SpO2 95%   BMI 23.87 kg/m²    GENERAL APPEARANCE: Awake and alert. Cooperative. no distress. HENT: Normocephalic. Atraumatic. Mucous membranes are moist.  No septal hematoma, hemotympanum, or blood in the oropharynx. NECK: Supple. Full range of motion of the neck without stiffness or pain. No cervical spine tenderness to palpation. EYES: PERRL. EOM's grossly intact. HEART/CHEST: RRR. No murmurs. Chest wall is not tender to palpation. LUNGS: Respirations unlabored. CTAB. Good air exchange. Speaking comfortably in full sentences. ABDOMEN: No tenderness. Soft. Non-distended. No masses. No organomegaly. No guarding or rebound. MUSCULOSKELETAL: No extremity edema. Compartments soft. No deformity. No tenderness in the extremities except for left knee, left lower extremity is neurovascularly intact. All extremities neurovascularly intact.   No thoracic or lumbar spinal tenderness to palpation. SKIN: Warm and dry. No acute rashes. NEUROLOGICAL: Alert and oriented. CN's 2-12 intact. No gross facial drooping. Strength 5/5, sensation intact. GCS 15. NIH stroke scale of 0.  PSYCHIATRIC: Normal mood and affect. ED course / MDM:   Overall well appearing patient, in no acute distress, presenting for mechanical fall 2 days ago and generalized confusion and weakness worsening over past few months. Patient's family is concerned that she is unable to care for self at home. Physical exam remarkable for left knee tenderness to palpation. Differential diagnosis includes but is not limited to: Mechanical fall, intracranial hemorrhage, CVA, UTI, pneumonia, ACS    XR PELVIS (1-2 VIEWS)   Final Result   Bilateral hip degenerative changes         CT HEAD WO CONTRAST   Final Result   No acute intracranial abnormality. XR KNEE LEFT (3 VIEWS)   Final Result   1. Joint effusion   2. Tricompartmental osteoarthritic changes   3. No acute bony abnormality         XR HAND LEFT (MIN 3 VIEWS)   Final Result   No acute osseous abnormality. XR CHEST PORTABLE   Final Result   No acute abnormality. The Ekg interpreted by me shows  normal sinus rhythm with a rate of 65  Axis is   Normal  QTc is  normal  Intervals and Durations are unremarkable. ST Segments: nonspecific changes, new T wave inversions in lead II, V2, V3, V4, V5, and V6  Significant change from prior EKG dated 7/20/17    Covid swab is negative. Troponin within normal limits, EKG with nonspecific changes, potentially warrant further work-up while inpatient. Urinalysis shows trace leukocyte esterase but minimal white blood cells, patient denies dysuria, lower suspicion for UTI at this time. Coagulation studies are appropriately elevated for patient on Coumadin. BNP is within normal limits, no evidence of fluid overload on exam, low suspicion for CHF exacerbation.   No significant leukocytosis, anemia, thrombocytopenia. No electrolyte abnormalities or evidence of significant kidney dysfunction. Liver function testing unremarkable. Troponin is in normal limits, EKG does have new nonspecific changes but I do feel warrants further work-up while inpatient. Will defer to inpatient team for further management. Patient does have left knee pain that she reports is somewhat chronic. There is evidence of joint effusion and evidence of arthritis. Lower suspicion for traumatic injury given that she reported pain started prior to recent fall. Knee is not warm to the touch or significantly swollen, no pain with micromovements. Low suspicion for gout or septic arthritis. No evidence of fracture or dislocation. No reported focal neurologic deficits, all deficits have been progressive and nonfocal.  Low suspicion for stroke. Patient has no managements on exam, low suspicion for meningitis. No evidence of other traumatic injury on exam or imaging. Based on results of work-up, I am concerned for generalized confusion and weakness that is worsening over time. Patient's family concerned that she is unable to care for herself at home. Patient and family are amenable to possible nursing home placement. .  At this time, do feel the patient requires admission for further work-up and management. Midlevel provider discussed the patient with hospital team.    CLINICAL IMPRESSION  1. General weakness    2. Fall, initial encounter    3. Contusion of left hip, initial encounter    4. Osteoarthritis, unspecified osteoarthritis type, unspecified site        Blood pressure (!) 153/69, pulse 78, temperature 98.7 °F (37.1 °C), temperature source Oral, resp. rate 17, height 5' 8\" (1.727 m), weight 157 lb (71.2 kg), SpO2 90 %. DISPOSITION  Delano Webber was admitted in stable condition. All diagnostic, treatment, and disposition decisions were made by myself in conjunction with the advanced practice provider.     For all further details of the patient's emergency department visit, please see the advanced practice provider's documentation. Comment: Please note this report has been produced using speech recognition software and may contain errors related to that system including errors in grammar, punctuation, and spelling, as well as words and phrases that may be inappropriate. If there are any questions or concerns please feel free to contact the dictating provider for clarification.         Gilberto Wallace MD  06/04/21 [id number]

## 2021-06-02 NOTE — ED NOTES
Attempted to obtain urine. Unable. Noted that legs are twisted to the right.       Smiley Feliciano RN  06/02/21 0185

## 2021-06-02 NOTE — H&P
Hospital Medicine History & Physical      PCP: ROBERT Lux CNP    Date of Admission: 6/2/2021    Date of Service: Pt seen/examined on 6/2/2021      Chief Complaint:    Chief Complaint   Patient presents with    Fall     patient fell at home on monday, family reports she did not hit head no LOC, bruising to left hand, hx demntia, increased weakness/confusion per family at this time. PCP recommended coming in for possible admission for rehab     History Of Present Illness: The patient is a 78 y.o. female with atrial fibrillation, dementia, HTN who presented to [facility] ED with complaint of fall. Patient is a very poor historian, there is no family available at bedside for history. History is obtained from ER nurse who spoke with family. I also called her  and spoke to him. Patient reportedly lives at home with her , her daughter lives nearby. The patient has dementia. The patient fell on Monday. After the fall she had trouble ambulating.  brought her to the ER today for eval.    On my evaluation the patient is pleasantly confused, she is seen sitting up on ER cot. She complains of pain in the left leg. She denies any other complaints. 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 chronic       Past Surgical History:        Procedure Laterality Date    CATARACT REMOVAL Bilateral May, 13    Dr Heriberto Lebron  3/11    large sessile polyp    VARICOSE VEIN SURGERY         Medications Prior to Admission:    Prior to Admission medications    Medication Sig Start Date End Date Taking?  Authorizing Provider   amLODIPine (NORVASC) 2.5 MG tablet daily  Patient taking differently: QHS 3/22/21   ROBERT Castellano CNP   pravastatin (PRAVACHOL) 20 MG tablet daily 3/22/21   ROBERT Castellano CNP   LORazepam (ATIVAN) 0.5 MG tablet Take 1 tablet by mouth 2 times daily for 90 days. 3/22/21 6/20/21  ROBERT Castellano CNP   pantoprazole (PROTONIX) 40 MG tablet TAKE 1 TABLET BY MOUTH EVERY DAY BEFORE BREAKFAST 3/22/21   ROBERT Castellano CNP   warfarin (COUMADIN) 2.5 MG tablet TAKE 1 TAB DAILY EXCEPT 1/2 TAB ON SUNDAY  Patient taking differently: Monday takes 1.5 tablets. 2.5 mg every day besides Monday 3/22/21   ROBERT Castellano CNP   amLODIPine (NORVASC) 5 MG tablet TAKE 1 TABLET BY MOUTH DAILY 3/22/21   ROBERT Castellano CNP   digoxin (LANOXIN) 125 MCG tablet TAKE 1 TABLET BY MOUTH EVERY DAY 3/22/21   ROBERT Castellano CNP   acetaminophen (TYLENOL) 500 MG tablet Take 1-2 tablets by mouth 3 times daily as needed for Pain 8/11/17   Reji Agudelo MD       Allergies:  Beta adrenergic blockers    Social History:  The patient currently lives at home with her      TOBACCO:   reports that she quit smoking about 3 years ago. She has a 10.00 pack-year smoking history. She has never used smokeless tobacco.  ETOH:   reports no history of alcohol use. Family History:   Positive as follows:        Problem Relation Age of Onset    Cancer Mother 46        Breast    Deep Vein Thrombosis Father        REVIEW OF SYSTEMS:     Limited 2/2 dementia  Complains of left leg pain     PHYSICAL EXAM:    BP (!) 142/68   Pulse 73   Temp 97.9 °F (36.6 °C) (Oral)   Resp 18   Ht 5' 8\" (1.727 m)   Wt 157 lb (71.2 kg)   SpO2 94%   BMI 23.87 kg/m²     Gen: Elderly female seen sitting up on ER cot. No distress. Alert. Eyes: PERRL. No sclera icterus. No conjunctival injection. ENT: No discharge. Pharynx clear. Neck: No JVD. Trachea midline. Resp: No accessory muscle use. No crackles. No wheezes. No rhonchi. CV: Regular rate. Regular rhythm. No murmur. No rub. No edema. Capillary Refill: Brisk,< 3 seconds   Peripheral Pulses: +2 palpable, equal bilaterally   GI: Non-tender. Non-distended. No masses. No organomegaly. Normal bowel sounds. Skin: Warm and dry. No nodule on exposed extremities. No rash on exposed extremities. Bruising to left hand  Bruising to left buttock   M/S: No cyanosis. No joint deformity. No clubbing.   + left knee effusion  She will not actively range her left knee or hip 2/2 complaint of pain. PROM of the left knee and hip produces pain at the left knee with flexion, patient denies left hip pain with flexion   Neuro: Awake. Cranial nerves II through XII intact. No pronator drift. She will not lift her left leg 2/2 pain, she has equal strength bilaterally in upper extremities   Psych: Oriented x to person and president. No anxiety or agitation. CBC:   Recent Labs     06/02/21  1020   WBC 11.9*   HGB 12.9   HCT 39.1   MCV 88.0        BMP:   Recent Labs     06/02/21  1020      K 4.4      CO2 27   BUN 19   CREATININE 1.0     LIVER PROFILE:   Recent Labs     06/02/21  1020   AST 15   ALT 12   BILITOT 0.9   ALKPHOS 101     PT/INR:   Recent Labs     06/02/21  1400   PROTIME 26.0*   INR 2.22*     APTT: No results for input(s): APTT in the last 72 hours.   UA:  Recent Labs     06/02/21  1410   COLORU Yellow   PHUR 6.0   WBCUA 3-5   RBCUA 0-2   MUCUS Rare*   CLARITYU Clear   SPECGRAV 1.015   LEUKOCYTESUR TRACE*   UROBILINOGEN 1.0   BILIRUBINUR Negative   BLOODU Negative   GLUCOSEU Negative          CARDIAC ENZYMES  Recent Labs     06/02/21  1020 06/02/21  1420   TROPONINI <0.01 <0.01       U/A:    Lab Results   Component Value Date    NITRITE neg 10/10/2018    COLORU Yellow 06/02/2021    WBCUA 3-5 06/02/2021    RBCUA 0-2 06/02/2021    MUCUS Rare 06/02/2021    CLARITYU Clear 06/02/2021    SPECGRAV 1.015 06/02/2021    LEUKOCYTESUR TRACE 06/02/2021    BLOODU Negative 06/02/2021    GLUCOSEU Negative 06/02/2021       EKG:   Normal sinus rhythmT wave abnormality, consider anterolateral ischemiaAbnormal ECGWhen compared with ECG of 20-JUL-2017 14:52,Sinus rhythm has replaced Atrial fibrillationT wave inversion now evident in Anterolateral leadsConfirmed by Ellyn Williamson MD, [place]     RADIOLOGY  XR PELVIS (1-2 VIEWS)   Final Result   Bilateral hip degenerative changes         CT HEAD WO CONTRAST   Final Result   No acute intracranial abnormality. XR KNEE LEFT (3 VIEWS)   Final Result   1. Joint effusion   2. Tricompartmental osteoarthritic changes   3. No acute bony abnormality         XR HAND LEFT (MIN 3 VIEWS)   Final Result   No acute osseous abnormality. XR CHEST PORTABLE   Final Result   No acute abnormality. ASSESSMENT/PLAN:    #Left knee joint effusion   - could be hemarthrosis as pt is on coumadin   - based on my exam her knee pain is limiting movement of the LLE  - will ask for ortho eval  - PT OT eval for dc recommendations      #Left hand contusion  #Left buttock contusion  - no evidence of fracture on Xrays    #Abnormal EKG  - new anterolateral T wave inversions  - she denies CP to me  - Trop neg x 2. Will check third level  - place on tele  - repeat EKG in AM    #Dementia  - she is oriented to self and president. I spoke to her , he reports this is her baseline     #PAF  - currently in SR  - cont dig and warfarin- pharm to dose      #HTN  - cont norvasc    #HLD  - cont statin      DVT Prophylaxis: Warfarin   Diet: ADULT DIET;  Regular  Code Status: Full Code    Sam Martinez PA-C  6/2/2021 7:04 PM

## 2021-06-02 NOTE — PROGRESS NOTES
4 Eyes Skin Assessment     The patient is being assess for   Admission    I agree that 2 RN's have performed a thorough Head to Toe Skin Assessment on the patient. ALL assessment sites listed below have been assessed. Areas assessed for pressure by both nurses:   [x]   Head, Face, and Ears   [x]   Shoulders, Back, and Chest, Abdomen  [x]   Arms, Elbows, and Hands   [x]   Coccyx, Sacrum, and Ischium  [x]   Legs, Feet, and Heels        Skin Assessed Under all Medical Devices by both nurses:  messi              All Mepilex Borders were peeled back and area peeked at by both nurses:  No: n/a  Please list where Mepilex Borders are located:  n/a             **SHARE this note so that the co-signing nurse is able to place an eSignature**    Co-signer eSignature: Electronically signed by Cassandra Weathers RN on 6/2/21 at 8:03 PM EDT    Does the Patient have Skin Breakdown related to pressure?   No              Nikhil Prevention initiated:  Yes   Wound Care Orders initiated:  NA      [facility] nurse consulted for Pressure Injury (Stage 3,4, Unstageable, DTI, NWPT, Complex wounds)and New or Established Ostomies:  NA      Primary Nurse eSignature: Electronically signed by Bairon You RN on 6/2/21 at 6:13 PM EDT

## 2021-06-02 NOTE — PROGRESS NOTES
Received from ER. Oriented to room and general plan of care. Patient is forgetful. Bed alarm on, call light within reach. Patient is not able to demonstrate the ability to move from a reclining position to an upright position within the recliner due to pain and overall weakness. Bedside Mobility Assessment Tool (BMAT):     Assessment Level 1- Sit and Shake    1. From a semi-reclined position, ask patient to sit up and rotate to a seated position at the side of the bed. Can use the bedrail. 2. Ask patient to reach out and grab your hand and shake making sure patient reaches across his/her midline. Fail- Patient is unable to perform tasks, patient is MOBILITY LEVEL 1. Assessment Level 2- Stretch and Point   1. With patient in seated position at the side of the bed, have patient place both feet on the floor (or stool) with knees no higher than hips. 2. Ask patient to stretch one leg and straighten the knee, then bend the ankle/flex and point the toes. If appropriate, repeat with the other leg. Fail- Patient is unable to complete task. Patient is MOBILITY LEVEL 2. Assessment Level 3- Stand   1. Ask patient to elevate off the bed or chair (seated to standing) using an assistive device (cane, bedrail). 2. Patient should be able to raise buttocks off be and hold for a count of five. May repeat once. Fail- Patient unable to demonstrate standing stability. Patient is MOBILITY LEVEL 3. Assessment Level 4- Walk   1. Ask patient to march in place at bedside. 2. Then ask patient to advance step and return each foot. Some medical conditions may render a patient from stepping backwards, use your best clinical judgement. Fail- Patient not able to complete tasks OR requires use of assistive device. Patient is MOBILITY LEVEL 3.        Mobility Level- 4

## 2021-06-02 NOTE — PROGRESS NOTES
Spoke with pt's daughter Talisha Quispe via phone. She said that her father is very [facility] and was wanting all phone calls be directed to her. Pt's daughter is also pt's POA.

## 2021-06-02 NOTE — ED NOTES
Met with patient. Arrived from home with daughter and  at bedside. Reports falling on Monday. bruising to the left hand and left buttock. bruising to right knee. Family also reports history of UTI.  \" she has just been sitting on the toilet\" Will continue to assess      Aruna Madrigal RN  06/02/21 1824

## 2021-06-02 NOTE — ED NOTES
Home medication list reviewed with family.  Medical history reviewed with family due to confusion     Smiley Feliciano RN  06/02/21 0786

## 2021-06-02 NOTE — PLAN OF CARE
Problem: Safety:  Goal: Free from accidental physical injury  Description: Free from accidental physical injury  Outcome: Ongoing     Problem: Daily Care:  Goal: Daily care needs are met  Description: Daily care needs are met  Outcome: Ongoing     Problem: Pain:  Goal: Patient's pain/discomfort is manageable  Description: Patient's pain/discomfort is manageable  Outcome: Ongoing     Problem: Skin Integrity:  Goal: Skin integrity will stabilize  Description: Skin integrity will stabilize  Outcome: Ongoing

## 2021-06-03 NOTE — FLOWSHEET NOTE
21   Vital Signs   Temp 98.6 °F (37 °C)   Temp Source Oral   Pulse 74   Resp 18   /71   BP Location Left upper arm   Patient Position Semi fowlers   Level of Consciousness Alert (0)   MEWS Score 1   Oxygen Therapy   SpO2 92 %   O2 Device None (Room air)   Pt alert and oriented to name and . Confused to time/situation/place. Anxious and very fidgety playing with her telemetry wires. Telesitter in room for pt safety. Bed alarm on. Ativan given po per order.  Tamika Kim, RN

## 2021-06-03 NOTE — PROGRESS NOTES
Spoke to Dr. Emely Shoemaker (orthopedics) regarding consult. He stated someone would be into see pt tomorrow morning.  Tammie Morales RN

## 2021-06-03 NOTE — PROGRESS NOTES
Inpatient Occupational Therapy  Evaluation and Treatment    Unit: [facility]  Date:  6/3/2021  Patient Name:    Yu Varela  Admitting diagnosis:  Weakness [R53.1]  Admit Date:  6/2/2021  Precautions/Restrictions/WB Status/ Lines/ Wounds/ Oxygen: fall risk, IV, bed/chair alarm and confusion     Per ortho PA:Suspect hemarthrosis of the left knee after reported fall and given her INR of 2.2. Do not suspect septic joint at this time. She can go ahead and work with PT and be WBAT. Would continue with ice, elevation of the knee as well to help with swelling. Can use ACE during the day as well if she feels more comfortable with that. 2) with the left hand I would continue with ice and elevation for help with swelling. Continue ROM as tolerated on the hand. Treatment Time:  14:55-15:30  Treatment Number: 1     Billable Treatment Time: 25 minutes   Total Treatment Time:   35   minutes     Patient Goals for Therapy:  Unable to state       Discharge Recommendations: SNF  DME needs for discharge: defer to facility       Therapy recommendations for staff:   Assist of 2 with use of rolling walker (RW) for all transfers to/from BSC/chair    History of Present Illness: 78 y.o. female with atrial fibrillation, dementia, HTN who presented to [facility] ED with complaint of fall. Home Health S4 Level Recommendation:  NA  AM-PAC Score: AM-PAC Inpatient Daily Activity Raw Score: 13    Preadmission Environment  No family present. Patient is a poor historian. Pt. Lives with spouse. Daughter lives 2 minutes away. Home environment:  one story home  Steps to enter first floor:   ramp    Bathroom:  unknown  Equipment owned:  unknown     Preadmission Status / PLOF:  History of falls   Yes  Pt. Able to drive   No  Pt Fully independent with ADL's  Unknown  Pt. Required assistance from family for:  unknown    Pt.  Fully independent for transfers and gait and walked with: Unknown    Pain  No  Rating:NA  Location:  Pain Medicine Status: Denies need      Cognition    A&O Person   Able to follow 1 step commands    Subjective  Patient lying supine in bed with no family present - no visitors present due to COVID-19 restrictions  Pt agreeable to this OT sofi & tx. Upper Extremity ROM:    WFL,  pt able to perform all bed mobility, transfers, and gait without ROM limitation. Upper Extremity Strength:    BUE strength impaired but not formally assessed w/ MMT    Upper Extremity Sensation    WFL    Upper Extremity Proprioception:  Impaired    Coordination and Tone  Impaired    Balance  Functional Sitting Balance:  WFL  Functional Standing Balance:Impaired    Bed mobility:    Supine to sit:   Min A  Sit to supine:   Not Tested  Rolling:    Not Tested  Scooting in sitting:  CGA  Scooting to head of bed:   Not Tested    Bridging:   Not Tested    Transfers:    Sit to stand:  Min A  Stand to sit:  Min A  Bed to chair:   Min A and with use of RW  Standard toilet: Not Tested  Bed to [facility]:  Not Tested    Dressing:      UE: Min A  LE:    Max A    Bathing:    UE: Min A  LE:  Max A    Eating:   Not Tested    Toileting:  Not Tested    Activity Tolerance   Pt completed therapy session with No adverse symptoms noted w/activity  SpO2: 94% on RA  HR:  78  BP: 142/75 supine in bed     Positioning Needs:   Up in chair, call light and needs in reach. Alarm Set    Exercise / Activities Initiated:   N/A    Patient/Family Education:   Role of OT  Recommendations for DC  Safe RW use/hand placement    Assessment of Deficits: Pt seen for Occupational therapy evaluation in acute care setting. Pt demonstrated decreased Activity tolerance, ADLs, IADLs, Balance , Bathing, Bed mobility, Dressing, ROM, Safety Awareness, Strength, Transfers, Cognition and Coping Skills. Pt functioning below baseline and will likely benefit from skilled occupational therapy services to maximize safety and independence. Goal(s) : To be met in 3 Visits:  1).  Bed to toilet/BSC: CGA    To be met in 5 Visits:  1). Supine to/from Sit:  CGA  2). Upper Body Bathing:   CGA  3). Lower Body Bathing: Mod A  4). Upper Body Dressing:  CGA  5). Lower Body Dressing: Mod A  6). Pt to demonstrate UE exs x 15 reps with minimal cues    Rehabilitation Potential:  Fair for goals listed above. Strengths for achieving goals include: Pt motivated, PLOF and Pt cooperative  Barriers to achieving goals include:  Complexity of condition     Plan: To be seen 3-5 x/wk while in acute care setting for therapeutic exercises, bed mobility, transfers, dressing, bathing, family/patient education, ADL/IADL retraining, energy conservation training.        Tremayne Peralta OTR/L #[id number]      If patient discharges from this facility prior to next visit, this note will serve as the Discharge Summary

## 2021-06-03 NOTE — PROGRESS NOTES
At [place] [id number], Per monitor tech, pt had 9 beats of PAT. Pt resting in bed, eyes closed.  Perfectserve sent to nocturnleidy.  Neida Reed RN

## 2021-06-03 NOTE — FLOWSHEET NOTE
06/03/21 0259   Vital Signs   Temp 97.2 °F (36.2 °C)   Temp Source Oral   Pulse 71   Resp 18   /69   BP Location Right lower arm   Patient Position Lying left side   Level of Consciousness Alert (0)   MEWS Score 1   Oxygen Therapy   SpO2 94 %   O2 Device None (Room air)   Pt resting in bed, no acute distress.  Farida Hatch RN

## 2021-06-03 NOTE — PROGRESS NOTES
Progress Note    Admit Date:  6/2/2021    Admitted after a fall with hip/knee pain    Subjective:  Ms. Nichols Neighbours denies any pain today. Has underlying dementia and unable to provide much history    Objective:   Patient Vitals for the past 4 hrs:   BP Temp Temp src Pulse Resp SpO2   06/03/21 0848 126/84 99.8 °F (37.7 °C) Axillary 64 16 91 %            Intake/Output Summary (Last 24 hours) at 6/3/2021 1143  Last data filed at 6/3/2021 0930  Gross per 24 hour   Intake 240 ml   Output 900 ml   Net -660 ml       Physical Exam:  Gen: Elderly female seen sitting up on ER cot. No distress. Alert. Eyes: PERRL. No sclera icterus. No conjunctival injection. ENT: No discharge. Pharynx clear. Neck: No JVD. Trachea midline. Resp: No accessory muscle use. No crackles. No wheezes. No rhonchi. CV: Regular rate. Regular rhythm. No murmur. No rub. No edema. Capillary Refill: Brisk,< 3 seconds   Peripheral Pulses: +2 palpable, equal bilaterally   GI: Non-tender. Non-distended. No masses. No organomegaly. Normal bowel sounds. Skin: Warm and dry. No nodule on exposed extremities. No rash on exposed extremities. Bruising to left hand, Bruising to left buttock   M/S: no joint line TTP to the L knee, decreased AROM but symmetric bilaterally, good hip flexion on L   Neuro: Awake. , Cranial nerves II through XII intact. Psych: Oriented x to person and president. No anxiety or agitation.        Scheduled Meds:   warfarin (COUMADIN) daily dosing (placeholder)   Other RX Placeholder    amLODIPine  5 mg Oral Daily    digoxin  125 mcg Oral Daily    pantoprazole  40 mg Oral QAM AC    pravastatin  20 mg Oral Nightly    sodium chloride flush  5-40 mL Intravenous 2 times per day    amLODIPine  2.5 mg Oral Nightly       Continuous Infusions:   sodium chloride         PRN Meds:  LORazepam, sodium chloride flush, sodium chloride, ondansetron **OR** ondansetron, polyethylene glycol, acetaminophen **OR** acetaminophen      Data:  CBC: Recent Labs     06/02/21  1020 06/03/21  0608   WBC 11.9* 13.0*   HGB 12.9 12.3   HCT 39.1 37.7   MCV 88.0 87.4    285     BMP:   Recent Labs     06/02/21  1020 06/03/21  0608    134*   K 4.4 3.9    100   CO2 27 26   BUN 19 15   CREATININE 1.0 0.8     LIVER PROFILE:   Recent Labs     06/02/21  1020   AST 15   ALT 12   BILITOT 0.9   ALKPHOS 101     PT/INR:   Recent Labs     06/02/21  1400 06/03/21  0608   PROTIME 26.0* 25.8*   INR 2.22* 2.21*       CULTURES  COVID-19: not detected      RADIOLOGY  XR PELVIS (1-2 VIEWS)   Final Result   Bilateral hip degenerative changes         CT HEAD WO CONTRAST   Final Result   No acute intracranial abnormality. XR KNEE LEFT (3 VIEWS)   Final Result   1. Joint effusion   2. Tricompartmental osteoarthritic changes   3. No acute bony abnormality         XR HAND LEFT (MIN 3 VIEWS)   Final Result   No acute osseous abnormality. XR CHEST PORTABLE   Final Result   No acute abnormality. Cliffrod Mcnair MD have reviewed the chart on [facility] and personally interviewed and examined patient, reviewed the data (labs and imaging) and after discussion with my PA formulated the plan. Agree with note with the following edits. HPI:     Patient is unable to give me much history. She is able to move her left knee better. Ortho has seen the patient. Assessment hemarthrosis of the left knee after fall. PT and OT recommended. Discharge planning is working on placement. I reviewed the patient's Past Medical History, Past Surgical History, Medications, and Allergies. Physical exam:    BP (!) 147/77   Pulse 69   Temp 100 °F (37.8 °C) (Oral)   Resp 16   Ht 5' 8\" (1.727 m)   Wt 157 lb (71.2 kg)   SpO2 91%   BMI 23.87 kg/m²     Gen: No distress. Alert. Eyes: PERRL. No sclera icterus. No conjunctival injection. ENT: No discharge. Pharynx clear. Neck: Trachea midline. Normal thyroid. Resp: No accessory muscle use.  No crackles. No wheezes. No rhonchi. No dullness on percussion. CV: Regular rate. Regular rhythm. No murmur or rub. No edema. GI: Non-tender. Non-distended. No masses. No organomegaly. Normal bowel sounds. No hernia. Skin: Warm and dry. No nodule on exposed extremities. No rash on exposed extremities. Bruising of left hand and left buttock  Lymph: No cervical LAD. No supraclavicular LAD. M/S: No cyanosis. No joint deformity. No clubbing. Mild swelling of left knee with normal ROM. Tenderness on moving left hip. Neuro: Awake. Reflexes 2+ symmetric bilaterally. Moves all 4 extremities, non focal  Psych: Oriented x 1. No anxiety or agitation. Assessment/Plan:  Left knee pain   - could be hemarthrosis as pt is on coumadin-->h/h stable  - Ortho consult   - Xray without acute findings   - No TTP, no bruising, decreased AROM but symmetric bilaterally   - PT OT   - hip pain due to fall and arthritis. No fracture       Left hand contusion  Left buttock contusion  - no evidence of fracture on Xrays     Abnormal EKG  - new anterolateral T wave inversions  - she denies CP  - Trop neg x 3  - place on tele  - repeat EKG in AM--unchanged T wave inversions  - Will talk with family members to determine if they would like further work-up     Dementia  - she is oriented to self and president.  - , he reports this is her baseline      PAF  - currently in SR  - cont dig and warfarin- pharm to dose       HTN   - cont norvasc     HLD  - cont statin     DVT Prophylaxis: Coumadin   Diet: ADULT DIET; Regular  Code Status: Full Code    Ortho eval pending.  PT/OT, will discuss with family if they would like to do further cardiac testing for abnormal EKG     BHARAT Phelps 11:43 AM 6/3/2021       [address], MD 6/3/2021 6:55 PM

## 2021-06-03 NOTE — PROGRESS NOTES
Inpatient Physical Therapy Evaluation and Treatment    Unit: [facility]  Date:  6/3/2021  Patient Name:    June Hernandez  Admitting diagnosis:  Weakness [R53.1]  Admit Date:  6/2/2021  Precautions/Restrictions/WB Status/ Lines/ Wounds/ Oxygen: Fall risk, Bed/chair alarm, Lines -IV and Confusion    Treatment Time: 2656 - 1241  Treatment Number:  1   Timed Code Treatment Minutes: 23 minutes  Total Treatment Minutes:  33  minutes    Patient Goals for Therapy: Did not state any goals. Discharge Recommendations: SNF  DME needs for discharge: defer to facility       Therapy recommendation for EMS Transport: can transport by wheelchair    Therapy recommendations for staff:   Assist of 2 with use of rolling walker (RW) and gait belt for all transfers and ambulation to/from [facility]  to/from chair    History of Present Illness: The patient is a 78 y.o. female with atrial fibrillation, dementia, HTN who presented to [facility] ED with complaint of fall. Patient is a very poor historian, there is no family available at bedside for history. History is obtained from ER nurse who spoke with family. I also called her  and spoke to him. Patient reportedly lives at home with her , her daughter lives nearby. The patient has dementia. The patient fell on Monday. After the fall she had trouble ambulating.  brought her to the ER today for eval.  On my evaluation the patient is pleasantly confused, she is seen sitting up on ER cot. She complains of pain in the left leg. She denies any other complaints. BHARAT Abbott' note dated 6/3/2021:  Assessment: left knee hemarthrosis after fall, left hand contusion  Plan:  1) Suspect hemarthrosis of the left knee after reported fall and given her INR of 2.2. Do not suspect septic joint at this time. She can go ahead and work with PT and be WBAT. Would continue with ice, elevation of the knee as well to help with swelling.   Can use ACE during the day as well if she feels more comfortable with that. 2) with the left hand I would continue with ice and elevation for help with swelling. Continue ROM as tolerated on the hand. 3) She can f/u with us in the office for re-evaluation in 2 weeks. Home Health S4 Level Recommendation:  Level 3 Safety  AM-PAC Mobility Score    AM-PAC Inpatient Mobility Raw Score : 12  AM-PAC Inpatient Mobility without Stair Climbing Raw Score : 10    Preadmission Environment  No family present. Patient is a poor historian, information gathered from 's note dated 6/2/2021  Pt. Lives with spouse. Daughter lives 2 minutes away. Home environment:    one story home  Steps to enter first floor:   ramp            Bathroom:       unknown  Equipment owned:      unknown      Preadmission Status / PLOF:  History of falls             Yes  Pt. Able to drive          No  Pt Fully independent with ADL's         Unknown  Pt. Required assistance from family for:  unknown    Pt. Fully independent for transfers and gait and walked with: Unknown    Pain   No    Cognition    A&O Person   Able to follow 1 step commands with repetition with increased time to complete the task. Subjective  Patient lying supine in bed with no family present. Pt agreeable to this PT eval & tx. Upper Extremity ROM/Strength  Please see OT evaluation.       Lower Extremity ROM / Strength   AROM WFL: Yes  ROM limitations: N/A    Strength Assessment (measured on a 0-5 scale):  R LE   Quad   3   Ant Tib  3   Hamstring 3   Iliopsoas 3  L LE  Quad   3   Ant Tib  3   Hamstring 3   Iliopsoas 3    Lower Extremity Sensation    WFL    Lower Extremity Proprioception:   WFL    Coordination and Tone  WFL    Balance  Sitting:  Fair ; Min A   Comments:     Standing: Fair -; Min A   Comments: ~2 min    Bed Mobility (Patient needed increased time to complete the given task and needed verbal cueing for hand and foot placement)  Supine to Sit:    Mod A  (HOB elevated)  Sit to Supine:   Not Tested  Rolling:   Not Tested  Scooting in sitting: Mod A   Scooting in supine:  Not Tested    Transfer Training     Sit to stand: Mod A  with RW  Stand to sit:   Mod A  with RW  Bed to Chair:   Mod A  with use of gait belt and rolling walker (RW)    Gait gait completed as indicated below  Distance:      3 ft  Deviations (firm surface/linoleum):  decreased rusty, increased ALYSIA, forward flexed posture, decreased step length bilaterally and decreased stance time bilaterally  Assistive Device Used:    gait belt and rolling walker (RW)  Level of Assist:    Mod A  and 2 persons  Comment: Patient needed assist for weight shifting and clearing the LEs during ambulation. Stair Training deferred, pt does not have stairs in home environment     Activity Tolerance   Pt completed therapy session with No adverse symptoms noted w/activity  SpO2: 94% on room air  HR: 78 bpm  BP: 142/75    Positioning Needs   Pt up in chair, alarm set, positioned in proper neutral alignment and pressure relief provided. Call light provided and all needs within reach    Exercises Initiated  all completed bilaterally unless indicated  Ankle Pumps x 10 reps  LAQ x 10 reps    Other  None. Patient/Family Education   Pt educated on role of inpatient PT, POC, importance of continued activity, DC recommendations, safety awareness, transfer techniques, pacing activity and calling for assist with mobility. Assessment  Pt seen for Physical Therapy evaluation in acute care setting. Pt demonstrated decreased Activity tolerance, Balance, ROM, Safety and Strength as well as decreased independence with Ambulation, Bed Mobility  and Transfers. Recommending SNF upon discharge as patient functioning well below baseline, demonstrates good rehab potential and unable to return home due to limited or no family support, burden of care beyond caregiver ability, home environment not conducive to patient recovery and limited safety awareness.     Goals : To be met in 3 visits:  1). Independent with LE Ex x 10 reps    To be met in 6 visits:  1). Supine to/from sit: CGA  2). Sit to/from stand: Min A   3). Bed to chair: Min A   4). Gait: Ambulate 50 ft.  with  Min A  and use of LRAD (least restrictive assistive device)  5). Tolerate B LE exercises 3 sets of 10-15 reps    Rehabilitation Potential: Fair  Strengths for achieving goals include:   Pt cooperative   Barriers to achieving goals include:    impaired cognition and poor carryover effect. Plan    To be seen 3-5 x / week  while in acute care setting for therapeutic exercises, bed mobility, transfers, progressive gait training, balance training, and family/patient education. Signature: Marcia Patel MS PT, # [id number]    If patient discharges from this facility prior to next visit, this note will serve as the Discharge Summary.

## 2021-06-03 NOTE — CARE COORDINATION
INTERDISCIPLINARY PLAN OF CARE CONFERENCE    Date/Time: 6/3/2021 2:59 PM  Completed by: Sara Smith RN, Case Management      Patient Name:  Burnell Hashimoto  YOB: 1940  Admitting Diagnosis: Weakness [R53.1]     Admit Date/Time:  6/2/2021  9:12 AM    Chart reviewed. Interdisciplinary team contacted or reviewed plan related to patient progress and discharge plans. Disciplines included Case Management, Nursing, and Dietitian. Current Status:Stable  PT/OT recommendation for discharge plan of care: pending    Expected D/C Disposition:  Rehab  Confirmed plan with patient and/or family Yes confirmed with: (name)  bentley  Met with:patient  Discharge Plan Comments:  Reviewed chart and spoke with pt, spouse and caregiver at bedside. Plan is for STR pending therapy eval and pt and family choose [facility]. [facility] to [facility] who will review and return call to writer on acceptance/denial. Will cont to follow.     Home O2 in place on admit: No  Pt informed of need to bring portable home O2 tank on day of discharge for nursing to connect prior to leaving:  Not Indicated  Verbalized agreement/Understanding:  Not Indicated

## 2021-06-03 NOTE — CONSULTS
Department of Orthopedic Surgery  Physician Assistant   Consult Note        Reason for Consult:  Left knee swelling  Requesting Physician: Martha Moore, *  Date of Service: 6/3/2021 3:38 PM    CHIEF COMPLAINT:  As Above    History Obtained From:  patient, electronic medical record    HISTORY OF PRESENT ILLNESS:                The patient is a 78 y.o. female who presents with above chief complaint. Pt unable to answer any questions today and is pleasantly confused. Apparently had a recent fall and some complaints of left knee pain/swelling. She denies any current pain and cannot answer if anything has been hurting. She is noted to have ecchymosis of the left hand but denies current pain. Past Medical History:        Diagnosis Date    A-fib [facility])     Adrenal mass ([address] 3/12    felt benign    Anxiety     Colon polyp     Tubular adenoma: TAC + 2/09, +2/11    Osteoarthritis 2/12    hip    Osteoporosis     alendronate treatment > 5 yrs    Routine health maintenance     +FH breast Cancer     Ureteral obstruction, right 3/12    ? chronic     Past Surgical History:        Procedure Laterality Date    CATARACT REMOVAL Bilateral May, 13    Dr Shantanu Kim  3/11    large sessile polyp    VARICOSE VEIN SURGERY           Medications Prior to Admission:   Prior to Admission medications    Medication Sig Start Date End Date Taking? Authorizing Provider   warfarin (COUMADIN) 2.5 MG tablet TAKE 1 TAB DAILY EXCEPT 1/2 TAB ON SUNDAY  Patient taking differently: Take 2.5 mg by mouth daily Every day except on Mondays. Pt takes 3 mg po daily on Mondays. 3/22/21  Yes ROBERT Nicolas CNP   amLODIPine (NORVASC) 2.5 MG tablet daily  Patient taking differently: QHS 3/22/21   ROBERT Nicolas CNP   pravastatin (PRAVACHOL) 20 MG tablet daily 3/22/21   ROBERT Nicolas CNP   LORazepam (ATIVAN) 0.5 MG tablet Take 1 tablet by mouth 2 times daily for 90 days.  3/22/21 6/20/21 Recent Labs     06/02/21  1020 06/03/21  0608    134*   K 4.4 3.9    100   CO2 27 26   BUN 19 15   CREATININE 1.0 0.8   GLUCOSE 104* 119*     INR:   Recent Labs     06/02/21  1400 06/03/21  0608   INR 2.22* 2.21*       Radiology:   XR PELVIS (1-2 VIEWS)   Final Result   Bilateral hip degenerative changes         CT HEAD WO CONTRAST   Final Result   No acute intracranial abnormality. XR KNEE LEFT (3 VIEWS)   Final Result   1. Joint effusion   2. Tricompartmental osteoarthritic changes   3. No acute bony abnormality         XR HAND LEFT (MIN 3 VIEWS)   Final Result   No acute osseous abnormality. XR CHEST PORTABLE   Final Result   No acute abnormality. NM Cardiac Stress Test Nuclear Imaging    (Results Pending)          IMPRESSION/RECOMMENDATIONS:    Assessment: left knee hemarthrosis after fall, left hand contusion    Plan:  1) Suspect hemarthrosis of the left knee after reported fall and given her INR of 2.2. Do not suspect septic joint at this time. She can go ahead and work with PT and be WBAT. Would continue with ice, elevation of the knee as well to help with swelling. Can use ACE during the day as well if she feels more comfortable with that. 2) with the left hand I would continue with ice and elevation for help with swelling. Continue ROM as tolerated on the hand. 3) She can f/u with us in the office for re-evaluation in 2 weeks. Thank you for the opportunity to consult on this patient.     Reji Fernandez

## 2021-06-03 NOTE — CONSULTS
Pharmacy Note  Warfarin Consult  Dx: A-Fib  Goal INR range 2-3  Home Warfarin dose: 3mg on Monday, 2.5 mg on other days       Date  INR  Warfarin  6/2                   2.22                  no dose    Patient is therapeutic upon admit. Nursing believes patient received dose at home on 6/2. Recommend Warfarin mg tonight x1. Daily INR ordered. Rx will continue to manage therapy per consult order.     Rachel Koroma, [facility]

## 2021-06-04 PROBLEM — M25.569 KNEE PAIN: Status: ACTIVE | Noted: 2021-01-01

## 2021-06-04 NOTE — PROGRESS NOTES
Patient sitting in bed with no distress. Writer offered patient something to drink. Patient assessed for a check and change. No needs at this time.

## 2021-06-04 NOTE — PROGRESS NOTES
Shift assessment complete. See flow sheet. Due medications administered per MD orders. See MAR. Vital signs stable. Patient is alert and oriented x 1, self only. Pt denies any present needs/concerns. Call light explained and in reach.

## 2021-06-04 NOTE — PROGRESS NOTES
12.9 12.3 13.3   HCT 39.1 37.7 40.7   MCV 88.0 87.4 87.4    285 317     BMP:   Recent Labs     06/02/21  1020 06/03/21  0608 06/04/21  0543    134* 137   K 4.4 3.9 3.8    100 101   CO2 27 26 24   BUN 19 15 18   CREATININE 1.0 0.8 0.7     LIVER PROFILE:   Recent Labs     06/02/21  1020   AST 15   ALT 12   BILITOT 0.9   ALKPHOS 101     PT/INR:   Recent Labs     06/02/21  1400 06/03/21  0608 06/04/21  0543   PROTIME 26.0* 25.8* 23.3*   INR 2.22* 2.21* 1.99*       CULTURES  COVID-19: not detected      RADIOLOGY  XR PELVIS (1-2 VIEWS)   Final Result   Bilateral hip degenerative changes         CT HEAD WO CONTRAST   Final Result   No acute intracranial abnormality. XR KNEE LEFT (3 VIEWS)   Final Result   1. Joint effusion   2. Tricompartmental osteoarthritic changes   3. No acute bony abnormality         XR HAND LEFT (MIN 3 VIEWS)   Final Result   No acute osseous abnormality. XR CHEST PORTABLE   Final Result   No acute abnormality. NM Cardiac Stress Test Nuclear Imaging    (Results Pending)       Marta Riley MD have reviewed the chart on [facility] Armandoica and personally interviewed and examined patient, reviewed the data (labs and imaging) and after discussion with my PA formulated the plan. Agree with note with the following edits. HPI:     Patient is unable to give me much history. She is able to move her left knee better. Ortho has seen the patient. Assessment hemarthrosis of the left knee after fall. PT and OT recommended. Discharge planning is working on placement. 6/4- family has agreed for a stress test.     I reviewed the patient's Past Medical History, Past Surgical History, Medications, and Allergies. Physical exam:    BP (!) 153/69   Pulse 78   Temp 98.7 °F (37.1 °C) (Oral)   Resp 17   Ht 5' 8\" (1.727 m)   Wt 157 lb (71.2 kg)   SpO2 90%   BMI 23.87 kg/m²     Gen: No distress. Alert. Eyes: PERRL. No sclera icterus.  No conjunctival injection. ENT: No discharge. Pharynx clear. Neck: Trachea midline. Normal thyroid. Resp: No accessory muscle use. No crackles. No wheezes. No rhonchi. No dullness on percussion. CV: Regular rate. Regular rhythm. No murmur or rub. No edema. GI: Non-tender. Non-distended. No masses. No organomegaly. Normal bowel sounds. No hernia. Skin: Warm and dry. No nodule on exposed extremities. No rash on exposed extremities. Bruising of left hand and left buttock  Lymph: No cervical LAD. No supraclavicular LAD. M/S: No cyanosis. No joint deformity. No clubbing. Mild swelling of left knee with normal ROM. Tenderness on moving left hip. Neuro: Awake. Reflexes 2+ symmetric bilaterally. Moves all 4 extremities, non focal  Psych: Oriented x 1. No anxiety or agitation. Assessment/Plan:  Left knee pain/effusion  - could be hemarthrosis as pt is on coumadin-->h/h stable  - Ortho consult: WBAT, Ice and elevation, follow up OP  - Xray without acute findings   - No TTP, no bruising, decreased AROM but symmetric bilaterally   - PT OT: recommends SNF  - hip pain due to fall and arthritis. No fracture    Left hand contusion  Left buttock contusion  - no evidence of fracture on Xrays     Abnormal EKG  - new anterolateral T wave inversions  - she denies CP  - Trop neg x 3  - place on tele  - repeat EKG in AM--unchanged T wave inversions  - Family would like to have NM Stress test--ordered. It may not be done today due to scheduling issues.     Dementia  - she is oriented to self and president.  - , he reports this is her baseline      PAF  - currently in SR  - cont dig and warfarin- pharm to dose       HTN   - cont norvasc     HLD  - cont statin     DVT Prophylaxis: Coumadin   Diet: Diet NPO  Code Status: Full Code    Ortho okay with WBAT to LLE, NM stress pending. Plans for SNF at discharge.  WBC trending up, check PCT, remains afebrile     Dolly Galeazzi, PA 8:53 AM 6/4/2021       Hernan Lewis MD

## 2021-06-04 NOTE — PROGRESS NOTES
Patient off the floor for hip surgery. Patient left in stable condition on room air with some scattered bruising.

## 2021-06-04 NOTE — PLAN OF CARE
Problem: Falls - Risk of:  Goal: Will remain free from falls  Description: Will remain free from falls  Outcome: Ongoing  Goal: Absence of physical injury  Description: Absence of physical injury  Outcome: Ongoing     Problem: Safety:  Goal: Free from accidental physical injury  Description: Free from accidental physical injury  Outcome: Ongoing  Goal: Free from intentional harm  Description: Free from intentional harm  Outcome: Ongoing     Problem: Daily Care:  Goal: Daily care needs are met  Description: Daily care needs are met  Outcome: Ongoing     Problem: Pain:  Goal: Patient's pain/discomfort is manageable  Description: Patient's pain/discomfort is manageable  Outcome: Ongoing     Problem: Skin Integrity:  Goal: Skin integrity will stabilize  Description: Skin integrity will stabilize  Outcome: Ongoing     Problem: Skin Integrity:  Goal: Will show no infection signs and symptoms  Description: Will show no infection signs and symptoms  Outcome: Ongoing  Goal: Absence of new skin breakdown  Description: Absence of new skin breakdown  Outcome: Ongoing

## 2021-06-05 NOTE — DISCHARGE INSTR - COC
Continuity of Care Form    Patient Name: Codie Connelly   :  1940  MRN:  [id number]    [address] [place] [address] date:  2021  Discharge date:  ***    Code Status Order: Full Code   Advance Directives:      Admitting Physician:  Rajesh Erickson MD  PCP: ROBERT Giles - CNP    Discharging Nurse: [facility] Unit/Room#: 0226/0226-01  Discharging Unit Phone Number: ***    Emergency Contact:   Extended Emergency Contact Information  Primary Emergency Contact: [facility]  Address: [address] [place] [address] Dr Lilly Blackwell of [address] [place] [address] Phone: [phone]  Relation: Spouse  Secondary Emergency Contact: Joceline Avila   [facility] of [address] [place] [address] Phone: [phone]  Relation: Child    Past Surgical History:  Past Surgical History:   Procedure Laterality Date    CATARACT REMOVAL Bilateral May, 13    Dr Vinita Peters  3/11    large sessile polyp    VARICOSE VEIN SURGERY         Immunization History:   Immunization History   Administered Date(s) Administered    COVID-19, Cardona Peter, PF, 30mcg/0.3mL 2021, 2021    Influenza 2011    Influenza Virus Vaccine 10/05/2010, 2011, 2013, 2014, 2015    Influenza Whole 2009, 10/12/2010, 2013, 2014, 2015    Influenza, High Dose (Fluzone 65 yrs and older) 10/02/2018    Influenza, Graeme Knight, IM, (6 mo and older Fluzone, Flulaval, Fluarix and 3 yrs and older Afluria) 10/24/2016, 2017    Influenza, Quadv, adjuvanted, 65 yrs +, IM, PF (Fluad) 10/15/2020    Influenza, Triv, inactivated, subunit, adjuvanted, IM (Fluad 65 yrs and older) 2019    Pneumococcal Conjugate 13-valent ([id number]) 2015    Pneumococcal Polysaccharide ([id number]) 10/10/2008    Zoster Live (Zostavax) 2011       Active Problems:  Patient Active Problem List   Diagnosis Code    Anxiety F41.9    Chronic midline low back pain without sciatica M54.5, G89.29    Ex-smoker Z87.891    Benign essential HTN I10    Pure hypercholesterolemia E78.00    Paroxysmal atrial fibrillation (HCC) I48.0    Primary osteoarthritis of knees, bilateral M17.0    Late onset Alzheimer's disease without behavioral disturbance (HCC) G30.1, F02.80    General weakness R53.1    Effusion of left knee joint M25.462    Dementia without behavioral disturbance ([address] F03.90    Fall W19. XXXA    Contusion of left hip S70. 02XA    Osteoarthritis M19.90    Knee pain M25.569       Isolation/Infection:   Isolation            No Isolation          Patient Infection Status       None to display            Nurse Assessment:  Last Vital Signs: /75   Pulse 72   Temp 99.7 °F (37.6 °C) (Oral)   Resp 16   Ht 5' 8\" (1.727 m)   Wt 157 lb (71.2 kg)   SpO2 90%   BMI 23.87 kg/m²     Last documented pain score (0-10 scale): Pain Level: 3  Last Weight:   Wt Readings from Last 1 Encounters:   06/02/21 157 lb (71.2 kg)     Mental Status:  disoriented    IV Access:  - None    Nursing Mobility/ADLs:  Walking   Dependent  Transfer  Dependent  Bathing  Dependent  Dressing  Dependent  Toileting  Dependent  Feeding  Dependent  Med Admin  Dependent  Med Delivery   crushed    Wound Care Documentation and Therapy:        Elimination:  Continence:   · Bowel: {YES / [id number]}  · Bladder: {YES / JN:02765}  Urinary Catheter: None   Colostomy/Ileostomy/Ileal Conduit: No       Date of Last BM: ***    Intake/Output Summary (Last 24 hours) at 6/5/2021 1332  Last data filed at 6/5/2021 1301  Gross per 24 hour   Intake 190 ml   Output 600 ml   Net -410 ml     I/O last 3 completed shifts: In: 130 [P.O.:120; I.V.:10]  Out: 300 [Urine:300]    Safety Concerns: At Risk for Falls    Impairments/Disabilities:      None    Nutrition Therapy:  Current Nutrition Therapy:   - Oral Diet:  General    Routes of Feeding: Oral  Liquids:  Thin Liquids  Daily Fluid Restriction: no  Last Modified Barium Swallow with Video (Video Swallowing Test): not done    Treatments at the Time of Hospital Discharge:   Respiratory Treatments: ***  Oxygen Therapy:  is not on home oxygen therapy. Ventilator:    - No ventilator support    Rehab Therapies: Physical Therapy  Weight Bearing Status/Restrictions: No weight bearing restirctions  Other Medical Equipment (for information only, NOT a DME order):  walker  Other Treatments: ***    Patient's personal belongings (please select all that are sent with patient):  Glasses    RN SIGNATURE:  Electronically signed by Jewell Dalton RN on 6/5/21 at 4:57 PM EDT    CASE MANAGEMENT/SOCIAL WORK SECTION    Inpatient Status Date: 06/02/21    Readmission Risk Assessment Score:  Readmission Risk              Risk of Unplanned Readmission:  0           Discharging to Facility/ Agency   Name: Name: [facility]  Address: [address]  Phone: [phone]  Fax: [phone]  ·     / signature: Electronically signed by Mary Medellin RN on 6/5/21 at 1:50 PM EDT    PHYSICIAN SECTION    Prognosis: Fair    Condition at Discharge: Stable    Rehab Potential (if transferring to Rehab): Fair    Recommended Labs or Other Treatments After Discharge: PT and OT    Physician Certification: I certify the above information and transfer of Delano Webber  is necessary for the continuing treatment of the diagnosis listed and that she requires [facility] for less 30 days.      Update Admission H&P: No change in H&P    PHYSICIAN SIGNATURE:  Electronically signed by Vero Elam MD on 6/5/21 at 1:33 PM EDT

## 2021-06-05 NOTE — CARE COORDINATION
DISCHARGE ORDER  Date/Time 2021 1:42 PM  Completed by: Caridad Valdivia RN, Case Management    Patient Name: Salome Charles    : 1940      Admit order Date and Status:obs  Noted discharge order. (verify MD's last order for status of admission/Traditional Medicare 3 [place] Inpatient qualifying stay required for SNF)    Confirmed discharge plan with:              Patient:  Yes              When pt confirms DC plan does any support person need to be contacted by CM Yes if yes who______                      Discharge to Facility: [address] [place] [address] [address] phone number for staff giving report: [phone]   Pre-certification completed: floor to [facility] Exemption Notification (HENS) completed: Rosa Gray completed   Discharge orders and Continuity of Care faxed to facility:  yes      Transportation:               Medical Transport explained with choice list offered to pt/family. Choice:No(no preference)  Agency used: [facility] up time:   [phone]      Pt/family/Nursing/Facility aware of  time: yes  Yes Names: Umumariola Graham from [facility], bedside nurse, pt and spouse and pt's daughter at bedside. Ambulance form completed:  yes:      Comments: Reviewed chart. Writer spoke with Arnaldo Graham from [facility] and she can accept the pt at d/c today. Per Arnaldo Graham pt does not need repeat C-19 testing. Pt negative for C-19 on 21. No other d/c needs voiced or identified. Pt is being d/c'd to [facility] today. Pt's O2 sats are 90% on RA. Discharge timeout done with [facility]. All discharge needs and concerns addressed. Discharging nurse to complete TERESA, reconcile AVS, and place final copy with patient's discharge packet. Discharging RN to ensure that written prescriptions for  Level II medications are sent with patient to the facility as per protocol.

## 2021-06-05 NOTE — FLOWSHEET NOTE
06/05/21 1121   Vital Signs   Temp 99 °F (37.2 °C)   Temp Source Oral   Pulse 67   Heart Rate Source Monitor   Resp 14   /69   BP Location Right upper arm   Patient Position Supine   Oxygen Therapy   SpO2 90 %   Pulse Oximeter Device Mode Intermittent   O2 Device None (Room air)     Patient returned to room 226 after stress test. She returned in bed. Patient alert and oriented to herself, stating she would like to go home. Patient not expressing need for food or drink at this time. Am assessment completed see flow sheet. Pt denies any pain/ needs at this time. Call light within reach. Will continue to monitor. Bedside Mobility Assessment Tool (BMAT):     Assessment Level 1- Sit and Shake    1. From a semi-reclined position, ask patient to sit up and rotate to a seated position at the side of the bed. Can use the bedrail. 2. Ask patient to reach out and grab your hand and shake making sure patient reaches across his/her midline. Fail- Patient is unable to perform tasks, patient is MOBILITY LEVEL 1. Assessment Level 2- Stretch and Point   1. With patient in seated position at the side of the bed, have patient place both feet on the floor (or stool) with knees no higher than hips. 2. Ask patient to stretch one leg and straighten the knee, then bend the ankle/flex and point the toes. If appropriate, repeat with the other leg. Fail- Patient is unable to complete task. Patient is MOBILITY LEVEL 2. Assessment Level 3- Stand   1. Ask patient to elevate off the bed or chair (seated to standing) using an assistive device (cane, bedrail). 2. Patient should be able to raise buttocks off be and hold for a count of five. May repeat once. Fail- Patient unable to demonstrate standing stability. Patient is MOBILITY LEVEL 3. Assessment Level 4- Walk   1. Ask patient to march in place at bedside. 2. Then ask patient to advance step and return each foot.  Some medical conditions may render a patient from stepping backwards, use your best clinical judgement. Fail- Patient not able to complete tasks OR requires use of assistive device. Patient is MOBILITY LEVEL 3. Mobility Level- 1     Patient is not able to demonstrate the ability to move from a reclining position to an upright position within the recliner due to advanced dementia.

## 2021-06-05 NOTE — FLOWSHEET NOTE
06/04/21 2034   Vital Signs   Temp 99.7 °F (37.6 °C)   Temp Source Oral   Pulse 65   Heart Rate Source Monitor   Resp 14   BP (!) 146/74   BP Location Right upper arm   Patient Position Semi fowlers   Level of Consciousness Alert (0)   MEWS Score 0   Oxygen Therapy   SpO2 91 %   O2 Device None (Room air)   Assessment complete- see flowsheets. Pt resting in bed at this time, call light within reach. Pt aware to call for any needs, bed alarm in place, swenson secured and draining, aware to be NPO after midnight. Will continue to monitor.   Agustin Xiong RN

## 2021-06-05 NOTE — PROGRESS NOTES
Pt had Lexiscan Myoview stress test, pt samantha well, pt had no chest pain, pt waiting to be scanned, resp easy/even. Pt in good condition.

## 2021-06-05 NOTE — PROGRESS NOTES
Pharmacy Note  Warfarin Consult  Dx: A-Fib  Goal INR range 2-3  Home Warfarin dose: 3mg on Monday, 2.5 mg on other days       Date  INR  Warfarin  6/2                   2.22                  no dose  6/3                   2.21                  2.5mg  6/4                   1.99                   3.0mg  6/5                    2.13                  2.5mg    Recommend Warfarin 2.5mg tonight x1. Daily INR ordered. Rx will continue to manage therapy per consult order. Tiara Morrison RPh    . Samaria Mir

## 2021-06-05 NOTE — PROGRESS NOTES
Physical Therapy    Attempted to see pt for PT tx. Pt currently off the floor for imaging. Will follow-up as PT schedule and pt status permit. No Charge.     Loreta Osborne, PT, DPT, OMT-C # [id number]

## 2021-06-06 NOTE — DISCHARGE SUMMARY
Name:  Kashif Pugh  Room:  9834/6930-93  MRN:    [id number]    Discharge Summary      This discharge summary is in conjunction with a complete physical exam done on the day of discharge. Discharging Physician: Dr. Serra Promise: 6/2/2021  Discharge:  6/5/2021    HPI:  The patient is a 78 y.o. female with atrial fibrillation, dementia, HTN who presented to [facility] ED with complaint of fall. Patient is a very poor historian, there is no family available at bedside for history. History is obtained from ER nurse who spoke with family. I also called her  and spoke to him. Patient reportedly lives at home with her , her daughter lives nearby. The patient has dementia. The patient fell on Monday. After the fall she had trouble ambulating.  brought her to the ER today for eval.     On my evaluation the patient is pleasantly confused, she is seen sitting up on ER cot. She complains of pain in the left leg. She denies any other complaints. Diagnoses this Admission and Hospital Course   Left knee pain/effusion  - could be hemarthrosis as pt is on coumadin-->h/h stable  - Ortho consulted: WBAT, Ice and elevation, follow up OP  - Xray without acute findings   - No TTP, no bruising, decreased AROM but symmetric bilaterally   - PT OT: recommends SNF  - hip pain due to fall and arthritis. No fracture     Left hand contusion  Left buttock contusion  - no evidence of fracture on Xrays     Abnormal EKG  - new anterolateral T wave inversions  - she denies CP  - Trop neg x 3  - placed on tele  - repeat EKG in AM--unchanged T wave inversions  - Family would like to have NM Stress test--ordered.  This was negative.     Dementia  - she is oriented to self and president.  - , he reports this is her baseline      PAF  - currently in SR  - cont'd dig and warfarin- pharm to dose       HTN   - cont'd norvasc     HLD  - cont'd statin      DVT Prophylaxis: Coumadin       Procedures (Please Review Full Report for Details)  N/A    Consults    Orthopedic surgery      Physical Exam at Discharge:    /75   Pulse 72   Temp 99.7 °F (37.6 °C) (Oral)   Resp 16   Ht 5' 8\" (1.727 m)   Wt 157 lb (71.2 kg)   SpO2 90%   BMI 23.87 kg/m²     Gen: No distress. Alert. Eyes: PERRL. No sclera icterus. No conjunctival injection. ENT: No discharge. Pharynx clear. Neck: Trachea midline. Normal thyroid. Resp: No accessory muscle use. No crackles. No wheezes. No rhonchi. No dullness on percussion. CV: Regular rate. Regular rhythm. No murmur or rub. No edema. GI: Non-tender. Non-distended. No masses. No organomegaly. Normal bowel sounds. No hernia. Skin: Warm and dry. No nodule on exposed extremities. No rash on exposed extremities. Bruising of left hand and left buttock  Lymph: No cervical LAD. No supraclavicular LAD. M/S: No cyanosis. No joint deformity. No clubbing. Mild swelling of left knee with normal ROM. Tenderness on moving left hip. Neuro: Awake. Reflexes 2+ symmetric bilaterally. Moves all 4 extremities, non focal  Psych: Oriented x 1. No anxiety or agitation.        CBC:   Recent Labs     06/04/21 0543 06/05/21  0536   WBC 14.0* 13.2*   HGB 13.3 12.4   HCT 40.7 37.7   MCV 87.4 88.2    336     BMP:   Recent Labs     06/04/21  0543      K 3.8      CO2 24   BUN 18   CREATININE 0.7     PT/INR:   Recent Labs     06/04/21  0543 06/05/21  0536   PROTIME 23.3* 24.9*   INR 1.99* 2.13*       U/A:    Lab Results   Component Value Date    NITRITE neg 10/10/2018    COLORU Yellow 06/02/2021    WBCUA 3-5 06/02/2021    RBCUA 0-2 06/02/2021    MUCUS Rare 06/02/2021    CLARITYU Clear 06/02/2021    SPECGRAV 1.015 06/02/2021    LEUKOCYTESUR TRACE 06/02/2021    BLOODU Negative 06/02/2021    GLUCOSEU Negative 06/02/2021       CULTURES  COVID-19: not detected     RADIOLOGY  NM Cardiac Stress Test Nuclear Imaging   Final Result      XR PELVIS (1-2 VIEWS)   Final Result   Bilateral hip degenerative changes         CT HEAD WO CONTRAST   Final Result   No acute intracranial abnormality. XR KNEE LEFT (3 VIEWS)   Final Result   1. Joint effusion   2. Tricompartmental osteoarthritic changes   3. No acute bony abnormality         XR HAND LEFT (MIN 3 VIEWS)   Final Result   No acute osseous abnormality. XR CHEST PORTABLE   Final Result   No acute abnormality. NM Stress 6/5/2021   Summary    There is normal isotope uptake at stress and rest. There is no evidence of    myocardial ischemia or scar. Hyperdynamic LV systolic function with YJ>17%    with uniform wall motion. Low risk study. Discharge Medications     Medication List      CHANGE how you take these medications    * amLODIPine 2.5 MG tablet  Commonly known as: NORVASC  daily  What changed: additional instructions     * amLODIPine 5 MG tablet  Commonly known as: NORVASC  TAKE 1 TABLET BY MOUTH DAILY  What changed: Another medication with the same name was changed. Make sure you understand how and when to take each.     warfarin 2.5 MG tablet  Commonly known as: COUMADIN  Take as directed. If you are unsure how to take this medication, talk to your nurse or doctor. Original instructions: TAKE 1 TAB DAILY EXCEPT 1/2 TAB ON SUNDAY  What changed:   · how much to take  · how to take this  · when to take this  · additional instructions         * This list has 2 medication(s) that are the same as other medications prescribed for you. Read the directions carefully, and ask your doctor or other care provider to review them with you. CONTINUE taking these medications    acetaminophen 500 MG tablet  Commonly known as: TYLENOL  Take 1-2 tablets by mouth 3 times daily as needed for Pain     digoxin 125 MCG tablet  Commonly known as: LANOXIN  TAKE 1 TABLET BY MOUTH EVERY DAY     LORazepam 0.5 MG tablet  Commonly known as: ATIVAN  Take 1 tablet by mouth 2 times daily for 3 days.      pantoprazole 40 MG tablet  Commonly known as: PROTONIX  TAKE 1 TABLET BY MOUTH EVERY DAY BEFORE BREAKFAST     pravastatin 20 MG tablet  Commonly known as: PRAVACHOL  daily           Where to Get Your Medications      You can get these medications from any pharmacy    Bring a paper prescription for each of these medications  · LORazepam 0.5 MG tablet           Discharged in stable condition to SNF. Follow Up: Follow up with PCP.         Jemima Gregory MD 6/6/2021 4:38 PM GI, ID and Surg eval appreicated  S/P perc lorena with significant drainage   HIDA scan and CT abd/Pelv noted  IV ABx per ID  now with ileus   removed ngt, started on liquid diet   GI and surgery F/U  ID F/U  recurrent and worsneing distention  NGT replaced  NPO   SUrg and GI F/U

## 2021-06-15 NOTE — ED PROVIDER NOTES
The patient's care was signed out to my by the preceding provider. Please refer to their documentation for further information. CHIEF COMPLAINT  Chief Complaint   Patient presents with    Respiratory Distress       Briefly, Taylor Calle is a 78 y.o. female  who presents to the ED complaining of respiratory distress. Patient found to be septic with healthcare associated pneumonia, dehydration with severe hyponatremia and hypoxic respiratory failure. FOCUSED PHYSICAL EXAMINATION  BP (!) 100/42   Pulse 100   Temp 101.6 °F (38.7 °C) (Bladder)   Resp 27   Ht 5' 8\" (1.727 m)   Wt 160 lb (72.6 kg)   SpO2 100%   BMI 24.33 kg/m²      Focused physical examination:    General appearance: Lying in bed on nonrebreather mask  Skin:  Warm. Dry. Ears, nose, mouth and throat:  Oral mucosa dry  Perfusion:  intact  Respiratory: Increased respiratory effort on nonrebreather mask      Neurological: Alert but only minimally responsive  Musculoskeletal:   Normal ROM, no deformities        MDM: Patient seen by preceding provider for increased respiratory distress found to have healthcare associated pneumonia with sepsis and severe dehydration with marked hypernatremia. Family was discussing end-of-life care the patient was signed out to me to follow-up ultimate disposition. The patient's daughter is her power of  and she spoke with other family members including the patient's . They have decided to make the patient DNR/comfort care. They would like her transferred back to her nursing home where they can initiate hospice care. We will contact the nursing home and try to arrange for hospice to meet them there. Patient was left on nonrebreather.   She was given IV fluids and broad-spectrum antibiotics but will be discharged at the family/power of 's request back to her care facility    During the patient's ED course, the patient was given:  Medications   vancomycin (VANCOCIN) 1,500 mg in dextrose 5 % 500 mL IVPB (1,500 mg Intravenous New Bag 6/15/21 0715)   dextrose 5 % solution ( Intravenous New Bag 6/15/21 0712)   piperacillin-tazobactam (ZOSYN) 4,500 mg in sodium chloride 0.9 % 100 mL IVPB (mini-bag) (0 mg Intravenous Stopped 6/15/21 0700)   acetaminophen (TYLENOL) suppository 650 mg (650 mg Rectal Given 6/15/21 0712)        CLINICAL IMPRESSION  1. HCAP (healthcare-associated pneumonia)    2. Hypernatremia    3. Dehydration    4. Acute respiratory failure with hypoxia (HCC)    5. Sepsis with acute hypoxic respiratory failure without septic shock, due to unspecified organism [facility])        Ricky Davidson 14      This chart was created using Dragon dictation software. Efforts were made by me to ensure accuracy, however some errors may be present due to limitations of this technology.         Andreea Russo MD  06/15/21 6006

## 2021-06-15 NOTE — ED TRIAGE NOTES
Pt arrived via EMS c/o respiratory distress. Per [facility] staff pt was different from last time that staff member had seen her, not speaking tonight as was her normal and they were unable to obtain O2 saturations > 70%. See Ambulance report for further information.

## 2021-06-15 NOTE — ED NOTES
Report given to JANIS Martinez. POC reviewed and care transferred at this time. Zeke Blizzard, RN.        Jus Laura RN  06/15/21 8032

## 2021-06-15 NOTE — ED NOTES
Spoke with Renee at [facility]. Informed that patient's family is opting for hospice care and wants to return to [facility] under hospice services. Family does not have a preference, is willing to use any company the [facility] can set up. [facility] states they will call [facility] and set up. Per Renee, they will call back when confirmed.       Leilani Byrne RN  06/15/21 3617

## 2021-06-15 NOTE — ED NOTES
Bedside report given to [employer]. [facility] form signed by Rory Cano, copy given to [employer]. IV removed. Venegas kept in place. 15L non-rebreather continues.  Pt being transferred to [facility] with comfort measures and plans for hospice upon arrival.      Steve Pfeiffer RN  06/15/21 4158

## 2021-06-15 NOTE — ED PROVIDER NOTES
[address] [facility]        Pt Name: Delano Webber  MRN: [id number]  [place] 1940  Date of evaluation: 6/15/2021  Provider: Joey Andrade MD  PCP: ROBERT Romo - [facility]  ED Attending: Joey Andrade MD    CHIEF COMPLAINT       Chief Complaint   Patient presents with    Respiratory Distress       HISTORY OF PRESENT ILLNESS   (Location/Symptom, Timing/Onset, Context/Setting, Quality, Duration, Modifying Factors, Severity)  Note limiting factors. Delano Webber is a 78 y.o. female brought in by EMS from her nursing home. Patient is apparently a DNR CC. Patient was found this morning to have some respiratory distress. She was given a couple breathing treatments at her nursing home without any improvement. When EMS arrived she was on 5 L via facemask and was apparently satting in the 80s. They were able to get her on a nonrebreather at 15 L/min and some improvement of her pulse ox. They found her to be confused and cold. Patient has not been interactive with them and only moaning. Report from nursing home is that the patient several days ago was still talkative with them. History is obtained from EMS. REVIEW OF SYSTEMS    (2-9 systems for level 4, 10 or more for level 5)     Review of Systems   Unable to perform ROS: Mental status change       Positives and Pertinent negatives as per HPI. Except as noted above in the ROS, all other systems were reviewed and negative.        PAST MEDICAL HISTORY     Past Medical History:   Diagnosis Date    A-fib [facility])     Adrenal mass ([facility] [address] 3/12    felt benign    Alzheimer's dementia, late onset ([facility] [address]     Anxiety     Arthritis of both knees     Colon polyp     Tubular adenoma: TAC + 2/09, +2/11    Functional weakness     Hypercholesteremia     Hypertension     Osteoarthritis 2/12    hip    Osteoporosis     alendronate treatment > 5 yrs    Routine health maintenance     +FH breast Cancer  Ureteral obstruction, right 3/12    ? chronic    Urinary retention          SURGICAL HISTORY     Past Surgical History:   Procedure Laterality Date    CATARACT REMOVAL Bilateral May, 13    Dr Luiza Bright  3/11    large sessile polyp    VARICOSE VEIN SURGERY           CURRENTMEDICATIONS       Discharge Medication List as of 6/15/2021 10:33 AM      CONTINUE these medications which have NOT CHANGED    Details   omeprazole (PRILOSEC) 20 MG delayed release capsule Take 20 mg by mouth dailyHistorical Med      !! amLODIPine (NORVASC) 2.5 MG tablet daily, Disp-90 tablet, R-3Normal      pravastatin (PRAVACHOL) 20 MG tablet daily, Disp-90 tablet, R-3Normal      warfarin (COUMADIN) 2.5 MG tablet TAKE 1 TAB DAILY EXCEPT 1/2 TAB ON SUNDAY, Disp-84 tablet, R-3Normal      !! amLODIPine (NORVASC) 5 MG tablet TAKE 1 TABLET BY MOUTH DAILY, Disp-90 tablet, R-3Normal      digoxin (LANOXIN) 125 MCG tablet TAKE 1 TABLET BY MOUTH EVERY DAY, Disp-90 tablet, R-3Normal      acetaminophen (TYLENOL) 500 MG tablet Take 1-2 tablets by mouth 3 times daily as needed for Pain, Disp-1 tablet, R-0NO PRINT      LORazepam (ATIVAN) 0.5 MG tablet Take 1 tablet by mouth 2 times daily for 3 days. , Disp-6 tablet, R-3Print       !! - Potential duplicate medications found. Please discuss with provider.             ALLERGIES     Beta adrenergic blockers    FAMILYHISTORY       Family History   Problem Relation Age of Onset    Cancer Mother 46        Breast    Deep Vein Thrombosis Father           SOCIAL HISTORY       Social History     Socioeconomic History    Marital status:      Spouse name: None    Number of children: None    Years of education: None    Highest education level: None   Occupational History    None   Tobacco Use    Smoking status: Former Smoker     Packs/day: 0.25     Years: 40.00     Pack years: 10.00     Quit date: 7/23/2017     Years since quitting: 3.9    Smokeless tobacco: Never Used    Tobacco comment: Rate and Rhythm: Normal rate and regular rhythm. Heart sounds: Normal heart sounds. No murmur heard. No friction rub. No gallop. Pulmonary:      Effort: Pulmonary effort is normal. No respiratory distress. Breath sounds: Examination of the left-middle field reveals rhonchi. Examination of the left-lower field reveals rhonchi. Rhonchi present. No decreased breath sounds or wheezing. Abdominal:      General: Bowel sounds are normal. There is no distension. Palpations: Abdomen is soft. Tenderness: There is no abdominal tenderness. There is no guarding or rebound. Musculoskeletal:         General: No tenderness. Normal range of motion. Cervical back: Normal range of motion and neck supple. Comments: Patient keeps her arms flexed towards her court. Lymphadenopathy:      Cervical: No cervical adenopathy. Skin:     General: Skin is warm and dry. Findings: No rash. Neurological:      GCS: GCS eye subscore is 4. GCS verbal subscore is 2. GCS motor subscore is 4. Comments: Patient is staring off into space moaning. Unable to perform additional neuro exam.         DIAGNOSTIC RESULTS   LABS:    Results for orders placed or performed during the hospital encounter of 06/15/21   Culture, Blood 1    Specimen: Blood   Result Value Ref Range    Blood Culture, Routine No Growth after 4 days of incubation. Culture, Blood 2    Specimen: Blood   Result Value Ref Range    Culture, Blood 2 No Growth after 4 days of incubation.     Lactate, Sepsis   Result Value Ref Range    Lactic Acid, Sepsis 6.5 (HH) 0.4 - 1.9 mmol/L   Blood gas, venous   Result Value Ref Range    pH, John 7.363 7.350 - 7.450    pCO2, John 47.9 40.0 - 50.0 mmHg    pO2, John 29.1 25 - 40 mmHg    HCO3, Venous 26.6 23.0 - 29.0 mmol/L    Base Excess, John 0.5 -3.0 - 3.0 mmol/L    O2 Sat, John 52 Not Established %    Carboxyhemoglobin 0.8 0.0 - 1.5 %    MetHgb, John 0.3 <1.5 %    TC02 (Calc), John 28 Not Established Poor data quality, interpretation may be adversely affectedNormal sinus rhythmLeft anterior fascicular blockMinimal voltage criteria for LVH, may be normal variantST & T wave abnormality, consider lateral ischemiaAbnormal ECGConfirmed by Jhonathan Mon MD, [facility] ([id number]) on 6/15/2021 11:04:27 AM       All other labs were within normal range ornot returned as of this dictation. EKG: All EKG's are interpreted by the Emergency Department Physician who either signs or Co-signs this chart in the absence of a cardiologist.  Please see their note for interpretation of EKG. EKG Interpretation    Interpreted by emergency department physician    Rhythm: normal sinus   Rate: normal  Axis: normal  Ectopy: none  Conduction: left anterior fasciclar block  ST Segments: nonspecific changes  T Waves: non specific changes  Q Waves: none    Clinical Impression: normal sinus rhythm, left anterior fascicular block, st and t wave changes concerning for lateral ischemia, possible LVH. RADIOLOGY:   Non-plain film images such as CT, Ultrasound and MRI are read by the radiologist.  Plain radiographic images are visualized and preliminarily interpreted by the ED Provider with the belowfindings:    Interpretation per the Radiologist below, if available at the time of this note:    CT HEAD WO CONTRAST   Final Result   1. No evidence of acute intracranial abnormality. 2. Severe cerebral white matter chronic microvascular ischemic disease. 3. Moderate diffuse cerebral atrophy. 4. No significant interval change. The findings were sent to the Radiology Results [address] at 6:16   am on 6/15/2021to be communicated to a licensed caregiver. RECOMMENDATIONS:   For clinical suspicion of acute ischemia, recommend MRI brain with   diffusion-weighted imaging for further evaluation         XR CHEST PORTABLE   Final Result   Left lower lobe atelectasis or pneumonia.                PROCEDURES   Unless otherwise noted below, none     Procedures    CRITICAL CARE TIME   Total critical care time today provided was 39 minutes. This excludes seperately billable procedures and family discussion time. Provided for acute severe sepsis requiring intervention with concern for potential decompensation. CONSULTS:  None      EMERGENCY DEPARTMENT COURSE and DIFFERENTIAL DIAGNOSIS/MDM:   Vitals:    Vitals:    06/15/21 0930 06/15/21 0945 06/15/21 1000 06/15/21 1015   BP: (!) 85/56 (!) 72/32 83/61 (!) 87/49   Pulse: 88 88 89 88   Resp: 30 (!) 31 (!) 31 (!) 31   Temp:       TempSrc:       SpO2: 99%  100% 100%   Weight:       Height:           Patient was given the following medications:  Medications   piperacillin-tazobactam (ZOSYN) 4,500 mg in sodium chloride 0.9 % 100 mL IVPB (mini-bag) (0 mg Intravenous Stopped 6/15/21 0700)   vancomycin (VANCOCIN) 1,500 mg in dextrose 5 % 500 mL IVPB (0 mg Intravenous Stopped 6/15/21 1021)   acetaminophen (TYLENOL) suppository 650 mg (650 mg Rectal Given 6/15/21 8742)       ED Course as of Jun 20 0836   Tue Jose 15, 2021   0642 Patient's daughter is at the bedside and was updated. [TC]   1578 Patient's daughter updated on her multiple lab abnormalities and the various treatment options. Daughter is going to call her brother for help in making treatment decisions. [TC]      ED Course User Index  [TC] Randy Nova MD     Patient is ill. Sepsis bundle initiated. Broad spectrum antibiotics started for presumed pneumonia. Patient initially started on sepsis fluid bolus. Given her degree of hypernatremia, this was stopped and switched to D5W. I had several lengthy discussions with the patient's daughter. Patient is currently a DNR-CC. She is significantly sick with a high probability of further deterioration. Patient's daughter talked to her sister. At this point they are inclined to only provide comfort care, however she would like to go talk to her father at home.   Care has been transitioned to the daytime doctor to make final disposition. Differential diagnosis included but was not limited to: stroke, TIA, intracranial bleed, trauma, infection/sepsis, medication side effect, intoxication/withdrawal, metabolic/electrolyte abnormalities. 07:00a.m. I have signed out [facility] Emergency Department care to Dr. Bassam Khan. We discussed the history, physical exam, completed/pending test results (if obtained) and current treatment plan. Please refer to his/her chart for the patients further details, remaining Emergency Department course, final disposition and diagnosis. FINAL IMPRESSION      1. HCAP (healthcare-associated pneumonia)    2. Hypernatremia    3. Dehydration    4. Acute respiratory failure with hypoxia (HCC)    5.  Sepsis with acute hypoxic respiratory failure without septic shock, due to unspecified organism [facility])          DISPOSITION/PLAN   DISPOSITION Decision To Discharge 06/15/2021 08:53:55 AM      PATIENT REFERRED TO:  ROBERT Castellano - [facility]  [address] [place]  [phone]    Schedule an appointment as soon as possible for a visit         DISCHARGE MEDICATIONS:  Discharge Medication List as of 6/15/2021 10:33 AM          DISCONTINUED MEDICATIONS:  Discharge Medication List as of 6/15/2021 10:33 AM      STOP taking these medications       pantoprazole (PROTONIX) 40 MG tablet Comments:   Reason for Stopping:                      (Please note that portions of this note were completed with a voice recognition program.  Efforts were made to edit the dictations but occasionally words are mis-transcribed.)    Dante Conway MD(electronically signed)              Dante Conway MD  06/20/21 5751

## 2021-06-17 ENCOUNTER — CARE COORDINATION (OUTPATIENT)
Dept: CARE COORDINATION | Age: 81
End: 2021-06-17

## 2021-06-17 ENCOUNTER — CARE COORDINATION (OUTPATIENT)
Dept: CASE MANAGEMENT | Age: 81
End: 2021-06-17

## 2021-06-17 NOTE — CARE COORDINATION
spoke to [facility] at [facility] who stated patient  on 21.

## 2021-06-19 LAB
BLOOD CULTURE, ROUTINE: NORMAL
CULTURE, BLOOD 2: NORMAL